# Patient Record
Sex: MALE | Race: WHITE
[De-identification: names, ages, dates, MRNs, and addresses within clinical notes are randomized per-mention and may not be internally consistent; named-entity substitution may affect disease eponyms.]

---

## 2019-08-30 ENCOUNTER — HOSPITAL ENCOUNTER (INPATIENT)
Dept: HOSPITAL 11 - JP.ED | Age: 74
LOS: 5 days | Discharge: HOME | DRG: 603 | End: 2019-09-04
Attending: INTERNAL MEDICINE | Admitting: INTERNAL MEDICINE
Payer: MEDICARE

## 2019-08-30 DIAGNOSIS — Z79.899: ICD-10-CM

## 2019-08-30 DIAGNOSIS — Z98.890: ICD-10-CM

## 2019-08-30 DIAGNOSIS — E11.9: ICD-10-CM

## 2019-08-30 DIAGNOSIS — Z85.038: ICD-10-CM

## 2019-08-30 DIAGNOSIS — Z96.641: ICD-10-CM

## 2019-08-30 DIAGNOSIS — I10: ICD-10-CM

## 2019-08-30 DIAGNOSIS — Z98.49: ICD-10-CM

## 2019-08-30 DIAGNOSIS — Z90.49: ICD-10-CM

## 2019-08-30 DIAGNOSIS — E78.00: ICD-10-CM

## 2019-08-30 DIAGNOSIS — E66.9: ICD-10-CM

## 2019-08-30 DIAGNOSIS — Z87.891: ICD-10-CM

## 2019-08-30 DIAGNOSIS — L03.116: Primary | ICD-10-CM

## 2019-08-30 DIAGNOSIS — Z79.4: ICD-10-CM

## 2019-08-30 DIAGNOSIS — Z79.82: ICD-10-CM

## 2019-08-30 DIAGNOSIS — E11.649: ICD-10-CM

## 2019-08-30 DIAGNOSIS — Z79.51: ICD-10-CM

## 2019-08-30 PROCEDURE — 87205 SMEAR GRAM STAIN: CPT

## 2019-08-30 PROCEDURE — 87186 SC STD MICRODIL/AGAR DIL: CPT

## 2019-08-30 PROCEDURE — 85025 COMPLETE CBC W/AUTO DIFF WBC: CPT

## 2019-08-30 PROCEDURE — 87040 BLOOD CULTURE FOR BACTERIA: CPT

## 2019-08-30 PROCEDURE — 36415 COLL VENOUS BLD VENIPUNCTURE: CPT

## 2019-08-30 PROCEDURE — 87077 CULTURE AEROBIC IDENTIFY: CPT

## 2019-08-30 PROCEDURE — 87070 CULTURE OTHR SPECIMN AEROBIC: CPT

## 2019-08-30 PROCEDURE — 96365 THER/PROPH/DIAG IV INF INIT: CPT

## 2019-08-30 PROCEDURE — 83605 ASSAY OF LACTIC ACID: CPT

## 2019-08-30 PROCEDURE — 99284 EMERGENCY DEPT VISIT MOD MDM: CPT

## 2019-08-30 PROCEDURE — 80053 COMPREHEN METABOLIC PANEL: CPT

## 2019-08-30 RX ADMIN — Medication SCH CAP: at 20:51

## 2019-08-30 RX ADMIN — INSULIN LISPRO SCH UNIT: 100 INJECTION, SOLUTION INTRAVENOUS; SUBCUTANEOUS at 22:14

## 2019-08-30 NOTE — PCM.HP.2
H&P History of Present Illness





- General


Date of Service: 08/30/19


Admit Problem/Dx: 


 Admission Diagnosis/Problem





Admission Diagnosis/Problem      Cellulitis of left anterior lower leg








Source of Information: Patient, Family, Provider


History Limitations: Reports: No Limitations





- History of Present Illness


Initial Comments - Free Text/Narative: 





CC: This leg is bothering me





HPI: Vasiliy presents to the emergency room today with left lower extremity 

swelling, redness and drainage. He thinks he bumped his anterior shin a couple 

of days ago but has not had any significant pain. Yesterday morning he noticed 

that his left leg was a little swollen and slightly red in the morning but did 

not pay attention to it throughout the day. He has not had any pain in the leg. 

He thought it was a little bit more swollen and red this morning. This 

afternoon it was itchy so he had some look at and they told him to get checked 

out right away with concerns for cellulitis. He reports itchy skin throughout 

the lower leg especially anteriorly. He does not have any numbness or tingling. 

He has had chills that started last night but has not measured any fevers at 

home. He feels weak and tired but otherwise feels okay. No complaints of nausea

, shortness of breath, abdominal pain or diarrhea. He has not had any sick 

contacts. He has never had cellulitis before. No recent antibiotics. He does 

not check his blood sugars regularly but did notice that it was low this 

morning at 45.





Workup in the emergency room revealed fairly normal labs. Physical examination 

revealed obvious cellulitis involving almost the entire left leg below the 

knee. He does have an open weeping area on the anterior shin that was cultured. 

He has received vancomycin and ertapenem. Cultures have been obtained. He is 

receiving some IV fluids. He will be admitted for further management.


  ** Left Leg


Pain Score (Numeric/FACES): 6





- Related Data


Allergies/Adverse Reactions: 


 Allergies











Allergy/AdvReac Type Severity Reaction Status Date / Time


 


No Known Allergies Allergy   Verified 08/30/19 18:02











Home Medications: 


 Home Meds





Aspirin [Ecotrin EC] 81 mg PO DAILY 08/30/19 [History]


Cholestyramine/Aspartame [Prevalite Powder] 4 gm PO BID 08/30/19 [History]


Dulaglutide [Trulicity] 1.5 mg SQ WEEKLY 08/30/19 [History]


Fluticasone Propionate [Flonase] 16 gm NS ASDIRECTED 08/30/19 [History]


Insulin Aspart [NovoLOG] 1 dose SQ TID 08/30/19 [History]


Insulin Degludec [Tresiba Flextouch U-100] 55 unit SQ DAILY 08/30/19 [History]


Lisinopril 10 mg PO DAILY 08/30/19 [History]


Propranolol [Inderal LA 24 Hr] 60 mg PO DAILY 08/30/19 [History]


Simvastatin [Zocor] 20 mg PO BEDTIME 08/30/19 [History]


glipiZIDE [Glucotrol] 15 mg PO DAILY 08/30/19 [History]


metFORMIN [Glucophage] 1,000 mg PO BIDMEALS 08/30/19 [History]











Past Medical History


HEENT History: Reports: Cataract


Cardiovascular History: Reports: High Cholesterol, Hypertension


Gastrointestinal History: Reports: Cholelithiasis


Endocrine/Metabolic History: Reports: Diabetes, Type II


Oncologic (Cancer) History: Reports: Colon


Dermatologic History: Reports: Cellulitis





- Past Surgical History


HEENT Surgical History: Reports: Cataract Surgery


GI Surgical History: Reports: Appendectomy, Cholecystectomy, Colon, Hernia 

Repair/Other


Other GI Surgeries/Procedures: 18 inches large colon


Musculoskeletal Surgical History: Reports: Hip Replacement


Other Musculoskeletal Surgeries/Procedures:: right





Social & Family History





- Family History


Cardiac: Denies: CAD





- Tobacco Use


Smoking Status *Q: Former Smoker


Used Tobacco, but Quit: Yes


Month/Year Tobacco Last Used: 40 years





- Caffeine Use


Caffeine Use: Reports: Coffee





- Recreational Drug Use


Recreational Drug Use: No





H&P Review of Systems





- Review of Systems:


Review Of Systems: See Below


Free Text/Narrative: 





A complete 12 point review of systems was obtained.  Pertinent positives and 

negatives are noted in the history of present illness.  All other systems were 

reviewed and were negative except as noted.





Exam





- Exam


Exam: See Below





- Vital Signs


Vital Signs: 


 Last Vital Signs











Temp  38.2 C H  08/30/19 18:59


 


Pulse  100   08/30/19 18:59


 


Resp  18   08/30/19 18:59


 


BP  180/76 H  08/30/19 18:59


 


Pulse Ox  97   08/30/19 18:59











Weight: 113.6 kg





- Exam


Quality Assessment: No: Supplemental Oxygen


General: Alert, Oriented, Cooperative.  No: Mild Distress


HEENT: Conjunctiva Clear, Mucosa Moist & Pink.  No: Scleral Icterus


Neck: Supple, Trachea Midline.  No: Lymphadenopathy


Lungs: Clear to Auscultation, Normal Respiratory Effort


Cardiovascular: Regular Rate, Regular Rhythm


GI/Abdominal Exam: Soft, No Distention


Extremities: Pedal Edema (pitting edema bilaterally to above the knee ), 

Increased Warmth (left lower leg from knee distally )


Skin: Warm, Dry, Rash (erythema over most of the left lower leg from the knee 

distally. Small open area with surrounding weeping anterior mid shin. small 

vesicles surrounding this area )


Neuro Extensive - Mental Status: Alert, Oriented x3, Nl Response to Commands


Neuro Extensive - Motor, Sensory, Reflexes: No: Dysarthria, Abnormal Motor, 

Tremor


Psychiatric: Alert, Normal Affect





- Patient Data


Lab Results Last 24 hrs: 


 Laboratory Results - last 24 hr











  08/30/19 08/30/19 08/30/19 Range/Units





  18:25 18:25 18:26 


 


WBC  6.7    (4.5-11.0)  K/uL


 


RBC  3.22 L    (4.30-5.90)  M/uL


 


Hgb  10.2 L    (12.0-15.0)  g/dL


 


Hct  32.2 L    (40.0-54.0)  %


 


MCV  100 H    (80-98)  fL


 


MCH  32 H    (27-31)  pg


 


MCHC  32    (32-36)  %


 


Plt Count  93 L    (150-400)  K/uL


 


Neut % (Auto)  73 H    (36-66)  %


 


Lymph % (Auto)  8 L    (24-44)  %


 


Mono % (Auto)  19 H    (2-6)  %


 


Eos % (Auto)  1 L    (2-4)  %


 


Baso % (Auto)  0    (0-1)  %


 


Sodium   133 L   (140-148)  mmol/L


 


Potassium   4.6   (3.6-5.2)  mmol/L


 


Chloride   100   (100-108)  mmol/L


 


Carbon Dioxide   23   (21-32)  mmol/L


 


Anion Gap   14.6 H   (5.0-14.0)  mmol/L


 


BUN   16   (7-18)  mg/dL


 


Creatinine   1.0   (0.8-1.3)  mg/dL


 


Est Cr Clr Drug Dosing   72.16   mL/min


 


Estimated GFR (MDRD)   > 60   (>60)  


 


Glucose   63 L   ()  mg/dL


 


Lactic Acid    1.3  (0.4-2.0)  mmol/L


 


Calcium   8.6   (8.5-10.1)  mg/dL


 


Total Bilirubin   1.5 H   (0.2-1.0)  mg/dL


 


AST   79 H   (15-37)  U/L


 


ALT   57   (12-78)  U/L


 


Alkaline Phosphatase   252 H   ()  U/L


 


Total Protein   6.8   (6.4-8.2)  g/dL


 


Albumin   2.6 L   (3.4-5.0)  g/dL


 


Globulin   4.2 H   (2.3-3.5)  g/dL


 


Albumin/Globulin Ratio   0.6 L   (1.2-2.2)  











Result Diagrams: 


 08/30/19 18:25





 08/30/19 18:25


Felix Results Last 24 hrs: 


 Microbiology











 08/30/19 18:30 Gram Stain - Final





 Leg, Left 














*Q Meaningful Use (ADM)





- VTE *Q


VTE Mechanical Contraindications *Q: Bilateral Lower Edema





- VTE Risk Assess *Q


Each Risk Factor Represents 1 Point: Swollen Legs, Current, Obesity ( BMI > 25 

kg/m2)


Total Score 1 Point Risk Factors: 2


Each Risk Factor Represents 2 Points: Age 60 - 74 Years, Malignancy (present or 

previous)


Total Score 2 Point Risk Factors: 4


Each Risk Factor Represents 3 Points: None


Total Score 3 Point Risk Factors: 0


Each Risk Factor Represents 5 Points: None


Total Score 5 Point Risk Factors: 0


Venous Thromboembolism Risk Factor Score *Q: 6





- Problem List


(1) Cellulitis of left lower extremity


SNOMED Code(s): 540918585


   ICD Code: L03.116 - CELLULITIS OF LEFT LOWER LIMB   Status: Acute   Current 

Visit: Yes   





(2) Insulin dependent diabetes mellitus


SNOMED Code(s): 09984292


   ICD Code: E11.9 - TYPE 2 DIABETES MELLITUS WITHOUT COMPLICATIONS; Z79.4 - 

LONG TERM (CURRENT) USE OF INSULIN   Status: Chronic   Current Visit: Yes   





(3) Hypertension


SNOMED Code(s): 47597087


   ICD Code: I10 - ESSENTIAL (PRIMARY) HYPERTENSION   Status: Chronic   Current 

Visit: Yes   


Qualifiers: 


   Hypertension type: essential hypertension   Qualified Code(s): I10 - 

Essential (primary) hypertension   





(4) Colon cancer


SNOMED Code(s): 281956143


   ICD Code: C18.9 - MALIGNANT NEOPLASM OF COLON, UNSPECIFIED   Status: Chronic

   Current Visit: Yes   


Qualifiers: 


   Colon location: unspecified part of colon   Qualified Code(s): C18.9 - 

Malignant neoplasm of colon, unspecified   


Problem List Initiated/Reviewed/Updated: Yes


Orders Last 24hrs: 


 Active Orders 24 hr











 Category Date Time Status


 


 Patient Status Manage Transfer [TRANSFER] Routine ADT  08/30/19 19:32 Active


 


 CULTURE BLOOD [BC] Urgent Lab  08/30/19 18:30 Received


 


 CULTURE BLOOD [BC] Urgent Lab  08/30/19 18:35 Received


 


 CULTURE WOUND + SMEAR [RM] Stat Lab  08/30/19 18:30 Results


 


 Sodium Chloride 0.9% [Normal Saline] 1,000 ml Med  08/30/19 18:30 Active





 IV ASDIRECTED   


 


 Vancomycin 1 gm Med  08/30/19 18:27 Active





 Sodium Chloride 0.9% [Normal Saline] 250 ml   





 IV ONETIME   


 


 Vancomycin 1 gm Med  08/30/19 19:31 Active





 Sodium Chloride 0.9% [Normal Saline] 250 ml   





 IV ONETIME   


 


 Blood Culture x2 Reflex Set [OM.PC] Urgent Oth  08/30/19 18:26 Ordered


 


 Resuscitation Status Routine Resus Stat  08/30/19 19:34 Ordered








 Medication Orders





Sodium Chloride (Normal Saline)  1,000 mls @ 500 mls/hr IV ASDIRECTED [facility]


   Last Admin: 08/30/19 18:55  Dose: 500 mls/hr


Vancomycin HCl 1 gm/ Sodium (Chloride)  250 mls @ 150 mls/hr IV ONETIME ONE


   Stop: 08/30/19 20:06


   Last Admin: 08/30/19 18:56  Dose: 150 mls/hr


Vancomycin HCl 1 gm/ Sodium (Chloride)  250 mls @ 150 mls/hr IV ONETIME ONE


   Stop: 08/30/19 21:10








Assessment/Plan Comment:: 





ASSESSMENT AND PLAN - 





Left lower extremity cellulitis - he does not meet criteria for sepsis. Fairly 

rapid spread over the past 36 hours. Immune compromised because of recent 

steroids, colon cancer treatment and diabetes. No recent antibiotics. Fairly 

impressive infection. He has received broad-spectrum antibiotics and cultures 

have been obtained.


-Continue vancomycin


-Start Pip/Tazo tomorrow night 


-Follow-up cultures


-Pain control


-Once heart rate has stabilized initiate diuresis to help with lower extremity 

edema





Insulin-dependent diabetes mellitus - blood sugar was low this morning and 

remains low tonight. Patient is asymptomatic at 67.


-Hold long-acting insulin


-Mealtime insulin 5 units


-Metformin


-Reassess additional management tomorrow





Colon cancer - last chemotherapy was about one month ago. Recently told by the 

oncologist that he is cancer free at this time.





Essential hypertension - continue home medications





Maintenance issues - 


- DVT prophylaxis - enoxaparin


- GI prophylaxis - not indicated


- Nutrition - diabetic


- Cash catheter - not indicated





CODE STATUS - full code





Admission justification - This patient will be admitted for inpatient services 

and is medically appropriate meeting medical necessity for inpatient admission 

as outlined in my documentation.  I reasonably expect the patient will require 

inpatient services that span a period time over 2 midnights. I reasonably 

expect this patient to be discharged or transferred within 96 hours after 

admission to the [facility].





Disposition - I would anticipate discharge home after the hospital stay





Primary care physician - Carla Goyal M.D.





- Mortality Measure


Prognosis:: Good

## 2019-08-30 NOTE — EDM.PDOC
ED HPI GENERAL MEDICAL PROBLEM





- General


Chief Complaint: Skin Complaint


Stated Complaint: CELULITIS


Time Seen by Provider: 08/30/19 18:15


Source of Information: Reports: Patient, Family


History Limitations: Reports: No Limitations





- History of Present Illness


INITIAL COMMENTS - FREE TEXT/NARRATIVE: 





73-year-old male arrives to the emergency room with a very erythematous, mildly 

painful weeping cellulitis of his left lower leg. He believes this started 48-

72 hours ago when he bumped his leg. He is diabetic. His glucose this morning 

was 45, he has not checked it since. He is running a temperature of 100.6, [facility] 

[facility] and on the way back he was very chilled and needed "2

" coats. [facility] into [facility] and they sent him to the emergency 

room. He is not currently on an antibiotic. He has had problems with bilateral 

lower extremity edema for the past several weeks. He denies any other symptoms 

such as shortness of breath, chest pain, palpitations, abdominal pain, nausea 

or vomiting or diarrhea.


Onset: Gradual


Duration: Day(s): (3 days)


Location: Reports: Lower Extremity, Left


Associated Symptoms: Reports: Fever/Chills


  ** Left Leg


Pain Score (Numeric/FACES): 6





- Related Data


 Allergies











Allergy/AdvReac Type Severity Reaction Status Date / Time


 


No Known Allergies Allergy   Verified 08/30/19 18:02











Home Meds: 


 Home Meds





Aspirin [Ecotrin EC] 81 mg PO DAILY 08/30/19 [History]


Cholestyramine/Aspartame [Prevalite Powder] 4 gm PO BID 08/30/19 [History]


Dulaglutide [Trulicity] 1.5 mg SQ WEEKLY 08/30/19 [History]


Fluticasone Propionate [Flonase] 16 gm NS ASDIRECTED 08/30/19 [History]


Insulin Aspart [NovoLOG] 1 dose SQ TID 08/30/19 [History]


Insulin Degludec [Tresiba Flextouch U-100] 55 unit SQ DAILY 08/30/19 [History]


Lisinopril 10 mg PO DAILY 08/30/19 [History]


Propranolol [Inderal LA 24 Hr] 60 mg PO DAILY 08/30/19 [History]


Simvastatin [Zocor] 20 mg PO BEDTIME 08/30/19 [History]


glipiZIDE [Glucotrol] 15 mg PO DAILY 08/30/19 [History]


metFORMIN [Glucophage] 1,000 mg PO BIDMEALS 08/30/19 [History]











Past Medical History


HEENT History: Reports: Cataract


Cardiovascular History: Reports: High Cholesterol, Hypertension


Gastrointestinal History: Reports: Cholelithiasis


Endocrine/Metabolic History: Reports: Diabetes, Type II


Oncologic (Cancer) History: Reports: Colon


Dermatologic History: Reports: Cellulitis





- Past Surgical History


HEENT Surgical History: Reports: Cataract Surgery


GI Surgical History: Reports: Appendectomy, Cholecystectomy, Colon, Hernia 

Repair/Other


Other GI Surgeries/Procedures: 18 inches large colon


Musculoskeletal Surgical History: Reports: Hip Replacement


Other Musculoskeletal Surgeries/Procedures:: right





Social & Family History





- Tobacco Use


Smoking Status *Q: Former Smoker


Used Tobacco, but Quit: Yes


Month/Year Tobacco Last Used: 40 years





- Caffeine Use


Caffeine Use: Reports: Coffee





- Recreational Drug Use


Recreational Drug Use: No





ED ROS GENERAL





- Review of Systems


Review Of Systems: See Below


Constitutional: Reports: Fever, Chills.  Denies: Decreased Appetite


HEENT: Reports: No Symptoms


Respiratory: Denies: Shortness of Breath


Cardiovascular: Denies: Chest Pain


GI/Abdominal: Denies: Abdominal Pain, Nausea, Vomiting


: Reports: No Symptoms


Neurological: Denies: Paresthesia


Psychiatric: Reports: No Symptoms





ED EXAM, SKIN/RASH


Exam: See Below


Exam Limited By: No Limitations


General Appearance: Alert, No Apparent Distress, Other (Patient is chilled and 

uncomfortable but not distressed)


Eye Exam: Bilateral Eye: EOMI (No jaundice)


Head: Atraumatic


Respiratory/Chest: No Respiratory Distress, Lungs Clear


Cardiovascular: Regular Rate, Rhythm, Tachycardia


GI/Abdominal: Soft, Non-Tender


Extremities: Other (Patient is pitting edema in both lower extremities from the 

knee down. The left leg has extensive significant erythema with distortion of 

the surface of the skin, blistering, and weeping with some oozing of purulent 

material. There is no significant traumatic findings such as a laceration or 

abrasion. The erythema extends down to the foot, up to the knee and slightly on 

the medial aspect of the left thigh.)


Psychiatric: Normal Affect, Normal Mood





Course





- Vital Signs


Last Recorded V/S: 


 Last Vital Signs











Temp  100.8 F H  08/30/19 18:59


 


Pulse  100   08/30/19 18:59


 


Resp  18   08/30/19 18:59


 


BP  180/76 H  08/30/19 18:59


 


Pulse Ox  97   08/30/19 18:59














- Orders/Labs/Meds


Orders: 


 Active Orders 24 hr











 Category Date Time Status


 


 CULTURE BLOOD [BC] Urgent Lab  08/30/19 18:30 Received


 


 CULTURE BLOOD [BC] Urgent Lab  08/30/19 18:35 Received


 


 CULTURE WOUND + SMEAR [RM] Stat Lab  08/30/19 18:30 Results


 


 Blood Culture x2 Reflex Set [OM.PC] Urgent Oth  08/30/19 18:26 Ordered








 Medication Orders





Acetaminophen (Tylenol)  650 mg PO Q4H PRN


   PRN Reason: Pain (Mild 1-3)/fever


Aspirin (Halfprin)  81 mg PO DAILY [facility]


Enoxaparin Sodium (Lovenox)  40 mg SUBCUT DAILY [facility]


Hydroxyzine HCl (Atarax)  25 mg PO Q6H PRN


   PRN Reason: Itching


Piperacillin Sod/Tazobactam (Sod 3.375 gm/ Sodium Chloride)  50 mls @ 100 mls/

hr IV Q6H [facility]


Sodium Chloride (Normal Saline)  1,000 mls @ 125 mls/hr IV ASDIRECTED [facility]


   Last Admin: 08/30/19 22:04  Dose: 125 mls/hr


Vancomycin HCl 1.5 gm/ Sodium (Chloride)  250 mls @ 150 mls/hr IV Q12H [facility]


Insulin Human Lispro (Humalog)  5 unit SUBCUT TIDMEALS [facility]


Insulin Human Lispro (Humalog)  0 unit SUBCUT QIDACANDBED [facility]; Protocol


Lactobacillus Rhamnosus (Culturelle)  1 cap PO BID [facility]


   Last Admin: 08/30/19 20:51  Dose: 1 cap


Lisinopril (Prinivil)  10 mg PO DAILY [facility]


Lorazepam (Ativan)  0.5 mg IVPUSH Q4H PRN


   PRN Reason: Nausea/Vomiting


Magnesium Hydroxide (Milk Of Magnesia)  30 ml PO Q12H PRN


   PRN Reason: Constipation


Melatonin (Melatonin)  9 mg PO BEDTIME PRN


   PRN Reason: Sleep


Metformin HCl (Glucophage)  1,000 mg PO BIDMEALS [facility]


Non-Formulary Medication (Cholestyramine/Aspartame [Prevalite Powder])  4 gm PO 

BID [facility]


Ondansetron HCl (Zofran Odt)  4 mg PO Q6H PRN


   PRN Reason: Nausea able to take PO


Ondansetron HCl (Zofran)  4 mg IV Q6H PRN


   PRN Reason: Nausea/Vomiting


Propranolol HCl (Inderal La)  60 mg PO DAILY [facility]


Senna/Docusate Sodium (Senna Plus)  1 tab PO BID PRN


   PRN Reason: Constipation


Simvastatin (Zocor)  20 mg PO BEDTIME [facility]


   Last Admin: 08/30/19 20:51  Dose: 20 mg








Labs: 


 Laboratory Tests











  08/30/19 08/30/19 08/30/19 Range/Units





  18:25 18:25 18:26 


 


WBC  6.7    (4.5-11.0)  K/uL


 


RBC  3.22 L    (4.30-5.90)  M/uL


 


Hgb  10.2 L    (12.0-15.0)  g/dL


 


Hct  32.2 L    (40.0-54.0)  %


 


MCV  100 H    (80-98)  fL


 


MCH  32 H    (27-31)  pg


 


MCHC  32    (32-36)  %


 


Plt Count  93 L    (150-400)  K/uL


 


Neut % (Auto)  73 H    (36-66)  %


 


Lymph % (Auto)  8 L    (24-44)  %


 


Mono % (Auto)  19 H    (2-6)  %


 


Eos % (Auto)  1 L    (2-4)  %


 


Baso % (Auto)  0    (0-1)  %


 


Sodium   133 L   (140-148)  mmol/L


 


Potassium   4.6   (3.6-5.2)  mmol/L


 


Chloride   100   (100-108)  mmol/L


 


Carbon Dioxide   23   (21-32)  mmol/L


 


Anion Gap   14.6 H   (5.0-14.0)  mmol/L


 


BUN   16   (7-18)  mg/dL


 


Creatinine   1.0   (0.8-1.3)  mg/dL


 


Est Cr Clr Drug Dosing   72.16   mL/min


 


Estimated GFR (MDRD)   > 60   (>60)  


 


Glucose   63 L   ()  mg/dL


 


Lactic Acid    1.3  (0.4-2.0)  mmol/L


 


Calcium   8.6   (8.5-10.1)  mg/dL


 


Total Bilirubin   1.5 H   (0.2-1.0)  mg/dL


 


AST   79 H   (15-37)  U/L


 


ALT   57   (12-78)  U/L


 


Alkaline Phosphatase   252 H   ()  U/L


 


Total Protein   6.8   (6.4-8.2)  g/dL


 


Albumin   2.6 L   (3.4-5.0)  g/dL


 


Globulin   4.2 H   (2.3-3.5)  g/dL


 


Albumin/Globulin Ratio   0.6 L   (1.2-2.2)  











Meds: 


Medications











Generic Name Dose Route Start Last Admin





  Trade Name Freq  PRN Reason Stop Dose Admin


 


Acetaminophen  650 mg  08/30/19 20:23  





  Tylenol  PO   





  Q4H PRN   





  Pain (Mild 1-3)/fever   





     





     





     


 


Aspirin  81 mg  08/31/19 09:00  





  Halfprin  PO   





  DAILY [facility]   





     





     





     





     


 


Enoxaparin Sodium  40 mg  08/31/19 09:00  





  Lovenox  SUBCUT   





  DAILY [facility]   





     





     





     





     


 


Hydroxyzine HCl  25 mg  08/30/19 20:23  





  Atarax  PO   





  Q6H PRN   





  Itching   





     





     





     


 


Piperacillin Sod/Tazobactam  50 mls @ 100 mls/hr  08/31/19 19:00  





  Sod 3.375 gm/ Sodium Chloride  IV   





  Q6H SOM   





     





     





     





     


 


Sodium Chloride  1,000 mls @ 125 mls/hr  08/30/19 20:23  08/30/19 22:04





  Normal Saline  IV   125 mls/hr





  ASDIRECTED [facility]   Administration





     





     





     





     


 


Vancomycin HCl 1.5 gm/ Sodium  250 mls @ 150 mls/hr  08/31/19 08:00  





  Chloride  IV   





  Q12H [facility]   





     





     





     





     


 


Insulin Human Lispro  5 unit  08/31/19 08:00  





  Humalog  SUBCUT   





  TIDMEALS [facility]   





     





     





     





     


 


Insulin Human Lispro  0 unit  08/30/19 20:23  





  Humalog  SUBCUT   





  QIDACANDBED [facility]   





     





     





  Protocol   





     


 


Lactobacillus Rhamnosus  1 cap  08/30/19 21:00  08/30/19 20:51





  Culturelle  PO   1 cap





  BID [facility]   Administration





     





     





     





     


 


Lisinopril  10 mg  08/31/19 09:00  





  Prinivil  PO   





  DAILY [facility]   





     





     





     





     


 


Lorazepam  0.5 mg  08/30/19 20:23  





  Ativan  IVPUSH   





  Q4H PRN   





  Nausea/Vomiting   





     





     





     


 


Magnesium Hydroxide  30 ml  08/30/19 20:23  





  Milk Of Magnesia  PO   





  Q12H PRN   





  Constipation   





     





     





     


 


Melatonin  9 mg  08/30/19 20:23  





  Melatonin  PO   





  BEDTIME PRN   





  Sleep   





     





     





     


 


Metformin HCl  1,000 mg  08/31/19 08:00  





  Glucophage  PO   





  BIDMEALS [facility]   





     





     





     





     


 


Non-Formulary Medication  4 gm  08/30/19 21:00  





  Cholestyramine/Aspartame [Prevalite Powder]  PO   





  BID [facility]   





     





     





     





     


 


Ondansetron HCl  4 mg  08/30/19 20:23  





  Zofran Odt  PO   





  Q6H PRN   





  Nausea able to take PO   





     





     





     


 


Ondansetron HCl  4 mg  08/30/19 20:23  





  Zofran  IV   





  Q6H PRN   





  Nausea/Vomiting   





     





     





     


 


Propranolol HCl  60 mg  08/31/19 09:00  





  Inderal La  PO   





  DAILY [facility]   





     





     





     





     


 


Senna/Docusate Sodium  1 tab  08/30/19 20:23  





  Senna Plus  PO   





  BID PRN   





  Constipation   





     





     





     


 


Simvastatin  20 mg  08/30/19 21:00  08/30/19 20:51





  Zocor  PO   20 mg





  BEDTIME [facility]OM   Administration





     





     





     





     














Discontinued Medications














Generic Name Dose Route Start Last Admin





  Trade Name Freq  PRN Reason Stop Dose Admin


 


Ertapenem 1 gm/ Sodium  100 mls @ 200 mls/hr  08/30/19 18:27  08/30/19 20:50





  Chloride  IV  08/30/19 18:56  200 mls/hr





  ONETIME ONE   Administration





     





     





     





     


 


Sodium Chloride  1,000 mls @ 500 mls/hr  08/30/19 18:30  08/30/19 18:55





  Normal Saline  IV   500 mls/hr





  ASDIRECTED [facility]OM   Administration





     





     





     





     


 


Vancomycin HCl 1 gm/ Sodium  250 mls @ 150 mls/hr  08/30/19 18:27  08/30/19 18:

56





  Chloride  IV  08/30/19 20:06  150 mls/hr





  ONETIME ONE   Administration





     





     





     





     


 


Vancomycin HCl 1 gm/ Sodium  250 mls @ 150 mls/hr  08/30/19 19:31  





  Chloride  IV  08/30/19 21:10  





  ONETIME ONE   





     





     





     





     














- Re-Assessments/Exams


Free Text/Narrative Re-Assessment/Exam: 





08/30/19 18:35


An IV was started, normal saline at 500 mL an hour was started. Patient will be 

treated for significant left lower leg cellulitis with possible early sepsis. 

Blood cultures 2 obtained, CBC CMP and lactic acid. After blood cultures, 1 g 

of Invanz and 1 g of vancomycin will be started. He'll likely need 

hospitalization by the hospitalist service.





Departure





- Departure


Time of Disposition: 20:07


Disposition: Admitted As Inpatient 68


Clinical Impression: 


 Cellulitis and abscess of left leg








- Discharge Information





- My Orders


Last 24 Hours: 


My Active Orders





08/30/19 18:26


Blood Culture x2 Reflex Set [OM.PC] Urgent 





08/30/19 18:30


CULTURE BLOOD [BC] Urgent 


CULTURE WOUND + SMEAR [RM] Stat 





08/30/19 18:35


CULTURE BLOOD [BC] Urgent 














- Assessment/Plan


Last 24 Hours: 


My Active Orders





08/30/19 18:26


Blood Culture x2 Reflex Set [OM.PC] Urgent 





08/30/19 18:30


CULTURE BLOOD [BC] Urgent 


CULTURE WOUND + SMEAR [RM] Stat 





08/30/19 18:35


CULTURE BLOOD [BC] Urgent

## 2019-08-31 RX ADMIN — INSULIN LISPRO SCH UNIT: 100 INJECTION, SOLUTION INTRAVENOUS; SUBCUTANEOUS at 17:30

## 2019-08-31 RX ADMIN — INSULIN LISPRO SCH UNIT: 100 INJECTION, SOLUTION INTRAVENOUS; SUBCUTANEOUS at 11:46

## 2019-08-31 RX ADMIN — INSULIN LISPRO SCH UNITS: 100 INJECTION, SOLUTION INTRAVENOUS; SUBCUTANEOUS at 17:30

## 2019-08-31 RX ADMIN — PROPRANOLOL HYDROCHLORIDE SCH MG: 60 CAPSULE, EXTENDED RELEASE ORAL at 08:36

## 2019-08-31 RX ADMIN — FLUTICASONE PROPIONATE SCH SPRAY: 50 SPRAY, METERED NASAL at 11:45

## 2019-08-31 RX ADMIN — INSULIN LISPRO SCH UNIT: 100 INJECTION, SOLUTION INTRAVENOUS; SUBCUTANEOUS at 21:04

## 2019-08-31 RX ADMIN — Medication SCH CAP: at 20:12

## 2019-08-31 RX ADMIN — INSULIN LISPRO SCH UNITS: 100 INJECTION, SOLUTION INTRAVENOUS; SUBCUTANEOUS at 08:38

## 2019-08-31 RX ADMIN — CHOLESTYRAMINE SCH GM: 4 POWDER, FOR SUSPENSION ORAL at 20:12

## 2019-08-31 RX ADMIN — TAZOBACTAM SODIUM AND PIPERACILLIN SODIUM SCH MLS/HR: 375; 3 INJECTION, SOLUTION INTRAVENOUS at 17:35

## 2019-08-31 RX ADMIN — INSULIN LISPRO SCH UNITS: 100 INJECTION, SOLUTION INTRAVENOUS; SUBCUTANEOUS at 11:46

## 2019-08-31 RX ADMIN — INSULIN LISPRO SCH: 100 INJECTION, SOLUTION INTRAVENOUS; SUBCUTANEOUS at 07:44

## 2019-08-31 RX ADMIN — Medication SCH CAP: at 08:35

## 2019-08-31 NOTE — PCM.PN
- General Info


Date of Service: 08/31/19


Subjective Update: 





No acute events overnight following admission. No pain in his left leg. No 

nausea or vomiting. Leg looks much better today with less swelling and less 

erythema. Right leg swelling also much less today. Cultures are negative so 

far. Blood sugars remain on the low side despite drastic reductions in his 

diabetes medications.


Functional Status: Reports: Pain Controlled, Tolerating Diet





- Review of Systems


General: Denies: Fever


Musculoskeletal: Reports: Leg Pain





- Patient Data


Vitals - Most Recent: 


 Last Vital Signs











Temp  36.9 C   08/31/19 07:00


 


Pulse  84   08/31/19 07:00


 


Resp  16   08/31/19 07:00


 


BP  117/64   08/31/19 08:35


 


Pulse Ox  96   08/31/19 07:00











Weight - Most Recent: 114.577 kg


I&O - Last 24 Hours: 


 Intake & Output











 08/30/19 08/31/19 08/31/19





 22:59 06:59 14:59


 


Intake Total 360 1166 620


 


Output Total  500 1000


 


Balance 360 666 -380











Lab Results Last 24 Hours: 


 Laboratory Results - last 24 hr











  08/30/19 08/30/19 08/30/19 Range/Units





  18:25 18:25 18:26 


 


WBC  6.7    (4.5-11.0)  K/uL


 


RBC  3.22 L    (4.30-5.90)  M/uL


 


Hgb  10.2 L    (12.0-15.0)  g/dL


 


Hct  32.2 L    (40.0-54.0)  %


 


MCV  100 H    (80-98)  fL


 


MCH  32 H    (27-31)  pg


 


MCHC  32    (32-36)  %


 


Plt Count  93 L    (150-400)  K/uL


 


Neut % (Auto)  73 H    (36-66)  %


 


Lymph % (Auto)  8 L    (24-44)  %


 


Mono % (Auto)  19 H    (2-6)  %


 


Eos % (Auto)  1 L    (2-4)  %


 


Baso % (Auto)  0    (0-1)  %


 


Sodium   133 L   (140-148)  mmol/L


 


Potassium   4.6   (3.6-5.2)  mmol/L


 


Chloride   100   (100-108)  mmol/L


 


Carbon Dioxide   23   (21-32)  mmol/L


 


Anion Gap   14.6 H   (5.0-14.0)  mmol/L


 


BUN   16   (7-18)  mg/dL


 


Creatinine   1.0   (0.8-1.3)  mg/dL


 


Est Cr Clr Drug Dosing   72.16   mL/min


 


Estimated GFR (MDRD)   > 60   (>60)  


 


Glucose   63 L   ()  mg/dL


 


Lactic Acid    1.3  (0.4-2.0)  mmol/L


 


Calcium   8.6   (8.5-10.1)  mg/dL


 


Total Bilirubin   1.5 H   (0.2-1.0)  mg/dL


 


AST   79 H   (15-37)  U/L


 


ALT   57   (12-78)  U/L


 


Alkaline Phosphatase   252 H   ()  U/L


 


Total Protein   6.8   (6.4-8.2)  g/dL


 


Albumin   2.6 L   (3.4-5.0)  g/dL


 


Globulin   4.2 H   (2.3-3.5)  g/dL


 


Albumin/Globulin Ratio   0.6 L   (1.2-2.2)  














  08/31/19 08/31/19 Range/Units





  04:15 04:15 


 


WBC  4.4 L   (4.5-11.0)  K/uL


 


RBC  3.09 L   (4.30-5.90)  M/uL


 


Hgb  9.8 L   (12.0-15.0)  g/dL


 


Hct  31.2 L   (40.0-54.0)  %


 


MCV  101 H   (80-98)  fL


 


MCH  32 H   (27-31)  pg


 


MCHC  31 L   (32-36)  %


 


Plt Count  91 L   (150-400)  K/uL


 


Neut % (Auto)    (36-66)  %


 


Lymph % (Auto)    (24-44)  %


 


Mono % (Auto)    (2-6)  %


 


Eos % (Auto)    (2-4)  %


 


Baso % (Auto)    (0-1)  %


 


Sodium   137 L  (140-148)  mmol/L


 


Potassium   4.3  (3.6-5.2)  mmol/L


 


Chloride   104  (100-108)  mmol/L


 


Carbon Dioxide   26  (21-32)  mmol/L


 


Anion Gap   11.3  (5.0-14.0)  mmol/L


 


BUN   15  (7-18)  mg/dL


 


Creatinine   1.0  (0.8-1.3)  mg/dL


 


Est Cr Clr Drug Dosing   72.16  mL/min


 


Estimated GFR (MDRD)   > 60  (>60)  


 


Glucose   78  ()  mg/dL


 


Lactic Acid    (0.4-2.0)  mmol/L


 


Calcium   8.2 L  (8.5-10.1)  mg/dL


 


Total Bilirubin    (0.2-1.0)  mg/dL


 


AST    (15-37)  U/L


 


ALT    (12-78)  U/L


 


Alkaline Phosphatase    ()  U/L


 


Total Protein    (6.4-8.2)  g/dL


 


Albumin    (3.4-5.0)  g/dL


 


Globulin    (2.3-3.5)  g/dL


 


Albumin/Globulin Ratio    (1.2-2.2)  











Felix Results Last 24 Hours: 


 Microbiology











 08/30/19 18:30 Gram Stain - Final





 Leg, Left 











Med Orders - Current: 


 Current Medications





Acetaminophen (Tylenol)  650 mg PO Q4H PRN


   PRN Reason: Pain (Mild 1-3)/fever


   Last Admin: 08/30/19 22:22 Dose:  650 mg


Aspirin (Halfprin)  81 mg PO DAILY [facility]


   Last Admin: 08/31/19 08:36 Dose:  81 mg


Fluticasone Propionate (Flonase)  0 gm NASBOTH DAILY [facility]


Hydroxyzine HCl (Atarax)  25 mg PO Q6H PRN


   PRN Reason: Itching


Vancomycin HCl 1.5 gm/ Sodium (Chloride)  250 mls @ 150 mls/hr IV Q12H [facility]


   Last Admin: 08/31/19 08:34 Dose:  150 mls/hr


Piperacillin/Tazobactam/ (Dextrose 3.375 gm/ Premix)  50 mls @ 100 mls/hr IV 

Q6H [facility]


Sodium Chloride (Normal Saline)  1,000 mls @ 25 mls/hr IV ASDIRECTED [facility]


Insulin Human Lispro (Humalog)  5 unit SUBCUT TIDMEALS [facility]


   Last Admin: 08/31/19 08:38 Dose:  5 units


Insulin Human Lispro (Humalog)  0 unit SUBCUT QIDACANDBED [facility]; Protocol


   Last Admin: 08/31/19 07:44 Dose:  Not Given


Lactobacillus Rhamnosus (Culturelle)  1 cap PO BID [facility]


   Last Admin: 08/31/19 08:35 Dose:  1 cap


Lisinopril (Prinivil)  10 mg PO DAILY [facility]


   Last Admin: 08/31/19 08:35 Dose:  10 mg


Lorazepam (Ativan)  0.5 mg IVPUSH Q4H PRN


   PRN Reason: Nausea/Vomiting


Magnesium Hydroxide (Milk Of Magnesia)  30 ml PO Q12H PRN


   PRN Reason: Constipation


Melatonin (Melatonin)  9 mg PO BEDTIME PRN


   PRN Reason: Sleep


[facility]


   Last Admin: 08/31/19 08:35 Dose:  1,000 mg


Non-Formulary Medication (Cholestyramine/Aspartame [Prevalite Powder])  4 gm PO 

BID [facility]


Ondansetron HCl (Zofran Odt)  4 mg PO Q6H PRN


   PRN Reason: Nausea able to take PO


Ondansetron HCl (Zofran)  4 mg IV Q6H PRN


   PRN Reason: Nausea/Vomiting


Propranolol HCl (Inderal La)  60 mg PO DAILY [facility]


   Last Admin: 08/31/19 08:36 Dose:  60 mg


Senna/Docusate Sodium (Senna Plus)  1 tab PO BID PRN


   PRN Reason: Constipation


Simvastatin (Zocor)  20 mg PO BEDTIME [facility]


   Last Admin: 08/30/19 20:51 Dose:  20 mg





Discontinued Medications





Enoxaparin Sodium (Lovenox)  40 mg SUBCUT DAILY [facility]


Ertapenem 1 gm/ Sodium (Chloride)  100 mls @ 200 mls/hr IV ONETIME ONE


   Stop: 08/30/19 18:56


   Last Admin: 08/30/19 20:50 Dose:  200 mls/hr


Sodium Chloride (Normal Saline)  1,000 mls @ 500 mls/hr IV ASDIRECTED [facility]


   Last Admin: 08/30/19 18:55 Dose:  500 mls/hr


Vancomycin HCl 1 gm/ Sodium (Chloride)  250 mls @ 150 mls/hr IV ONETIME ONE


   Stop: 08/30/19 20:06


   Last Admin: 08/30/19 18:56 Dose:  150 mls/hr


Vancomycin HCl 1 gm/ Sodium (Chloride)  250 mls @ 150 mls/hr IV ONETIME ONE


   Stop: 08/30/19 21:10


   Last Admin: 08/30/19 22:15 Dose:  150 mls/hr


Piperacillin Sod/Tazobactam (Sod 3.375 gm/ Sodium Chloride)  50 mls @ 100 mls/

hr IV Q6H [facility]


Sodium Chloride (Normal Saline)  1,000 mls @ 125 mls/hr IV ASDIRECTED [facility]


   Last Admin: 08/31/19 06:37 Dose:  125 mls/hr











- Exam


Quality Assessment: No: Supplemental Oxygen


General: Alert, Oriented, Cooperative, No Acute Distress


Lungs: Normal Respiratory Effort


Extremities: Pedal Edema, Increased Warmth (left lower leg)


Skin: Warm, Dry, Rash (erythema and dried scab left lower leg )


Psy/Mental Status: Alert, Normal Affect





- Problem List & Annotations


(1) Cellulitis of left lower extremity


SNOMED Code(s): 854903759


   Code(s): L03.116 - CELLULITIS OF LEFT LOWER LIMB   Status: Acute   Current 

Visit: Yes   





(2) Insulin dependent diabetes mellitus


SNOMED Code(s): 03516903


   Code(s): E11.9 - TYPE 2 DIABETES MELLITUS WITHOUT COMPLICATIONS; Z79.4 - 

LONG TERM (CURRENT) USE OF INSULIN   Status: Chronic   Current Visit: Yes   





(3) Hypertension


SNOMED Code(s): 32053401


   Code(s): I10 - ESSENTIAL (PRIMARY) HYPERTENSION   Status: Chronic   Current 

Visit: Yes   


Qualifiers: 


   Hypertension type: essential hypertension   Qualified Code(s): I10 - 

Essential (primary) hypertension   





(4) Colon cancer


SNOMED Code(s): 736835058


   Code(s): C18.9 - MALIGNANT NEOPLASM OF COLON, UNSPECIFIED   Status: Chronic 

  Current Visit: Yes   


Qualifiers: 


   Colon location: unspecified part of colon   Qualified Code(s): C18.9 - 

Malignant neoplasm of colon, unspecified   





- Problem List Review


Problem List Initiated/Reviewed/Updated: Yes





- My Orders


Last 24 Hours: 


My Active Orders





08/30/19 19:34


Resuscitation Status Routine 





08/30/19 20:23


Patient Status [ADT] Routine 


Communication Order [RC] PRN 


Communication Order [RC] PRN 


Diabetes Education [RC] Click to Edit 


Intake and Output [RC] QSHIFT 


Notify Provider Vital Signs [RC] ASDIRECTED 


Notify Provider [RC] PRN 


Oxygen Therapy [RC] PRN 


Up With Assistance [RC] ASDIRECTED 


Vital Signs [RC] Q4H 


Acetaminophen [Tylenol]   650 mg PO Q4H PRN 


Docusate Sodium/Sennosides [Senna Plus]   1 tab PO BID PRN 


Insulin Lispro [HumaLOG]   See Protocol  SUBCUT QIDACANDBED 


LORazepam [Ativan]   0.5 mg IVPUSH Q4H PRN 


Magnesium Hydroxide [Milk of Magnesia]   30 ml PO Q12H PRN 


Melatonin   9 mg PO BEDTIME PRN 


Ondansetron [Zofran ODT]   4 mg PO Q6H PRN 


Ondansetron [Zofran]   4 mg IV Q6H PRN 


hydrOXYzine HCl [Atarax]   25 mg PO Q6H PRN 





08/30/19 21:00


Cholestyramine/Aspartame [Prevalite Powder]   4 gm PO BID 


Lactobacillus Rhamnosus GG [Culturelle]   1 cap PO BID 


Simvastatin [Zocor]   20 mg PO BEDTIME 





08/31/19 08:00


Insulin Lispro [HumaLOG]   5 unit SUBCUT TIDMEALS 


Vancomycin 1.5 gm   Sodium Chloride 0.9% [Normal Saline] 250 ml IV Q12H 


metFORMIN [Glucophage]   1,000 mg PO BIDMEALS 





08/31/19 09:00


Aspirin [Halfprin]   81 mg PO DAILY 


Lisinopril [Prinivil]   10 mg PO DAILY 


Propranolol [Inderal LA]   60 mg PO DAILY 





08/31/19 10:00


Fluticasone Propionate [Flonase]   0 gm NASBOTH DAILY 





08/31/19 10:35


Sodium Chloride 0.9% [Normal Saline] 1,000 ml IV ASDIRECTED 





08/31/19 11:30


GLUCOSE POC LAB TO COLLECT [POC] QIDACANDBED 





08/31/19 16:30


GLUCOSE POC LAB TO COLLECT [POC] QIDACANDBED 





08/31/19 18:00


Piperacillin/Tazobactam/Dext [Zosyn in Dextrose Iso-Osmotic 3.375 GM] 3.375 gm 

  Premix Bag 1 bag IV Q6H 





08/31/19 21:00


GLUCOSE POC LAB TO COLLECT [POC] QIDACANDBED 





09/01/19 05:00


BASIC METABOLIC PANEL,BMP [CHEM] Timed 


CBC W/O DIFF,HEMOGRAM [HEME] Timed (1) 





09/01/19 07:30


GLUCOSE POC LAB TO COLLECT [POC] QIDACANDBED 





09/01/19 11:30


GLUCOSE POC LAB TO COLLECT [POC] QIDACANDBED 





09/01/19 16:30


GLUCOSE POC LAB TO COLLECT [POC] QIDACANDBED 





09/01/19 21:00


GLUCOSE POC LAB TO COLLECT [POC] QIDACANDBED 





09/02/19 07:30


GLUCOSE POC LAB TO COLLECT [POC] QIDACANDBED 





09/02/19 11:30


GLUCOSE POC LAB TO COLLECT [POC] QIDACANDBED 





09/02/19 16:30


GLUCOSE POC LAB TO COLLECT [POC] QIDACANDBED 





09/02/19 21:00


GLUCOSE POC LAB TO COLLECT [POC] QIDACANDBED 





09/03/19 07:30


GLUCOSE POC LAB TO COLLECT [POC] QIDACANDBED 





09/03/19 11:30


GLUCOSE POC LAB TO COLLECT [POC] QIDACANDBED 





09/03/19 16:30


GLUCOSE POC LAB TO COLLECT [POC] QIDACANDBED 





09/03/19 21:00


GLUCOSE POC LAB TO COLLECT [POC] QIDACANDBED 





09/04/19 07:30


GLUCOSE POC LAB TO COLLECT [POC] QIDACANDBED 





09/04/19 11:30


GLUCOSE POC LAB TO COLLECT [POC] QIDACANDBED 





09/04/19 16:30


GLUCOSE POC LAB TO COLLECT [POC] QIDACANDBED 





09/04/19 21:00


GLUCOSE POC LAB TO COLLECT [POC] QIDACANDBED 





09/05/19 07:30


GLUCOSE POC LAB TO COLLECT [POC] QIDACANDBED 














- Plan


Plan:: 





ASSESSMENT AND PLAN - 





Left lower extremity cellulitis - excellent improvement overnight with 

antibiotics and some IV fluids. Swelling and redness have decreased 

significantly. No fevers overnight. Clinically looks better today. Cultures 

negative so far.


-Continue vancomycin


-Start Pip/Tazo tonight


-Follow-up cultures


-Pain control


-Consider diuresis tomorrow





Insulin-dependent diabetes mellitus - blood sugar still low this morning 

despite drastic reductions.


-Continue to hold long-acting insulin


-Mealtime insulin 5 units


-Metformin


-Reassess additional management tomorrow





Colon cancer - last chemotherapy was about one month ago. Recently told by the 

oncologist that he is cancer free at this time.





Essential hypertension - continue home medications





Maintenance issues - 


- DVT prophylaxis - enoxaparin


- GI prophylaxis - not indicated


- Nutrition - diabetic





Disposition - I would anticipate discharge home after the hospital stay





Primary care physician - [place], [place]





Hilario Farias M.D.

## 2019-09-01 RX ADMIN — FLUTICASONE PROPIONATE SCH SPRAY: 50 SPRAY, METERED NASAL at 08:56

## 2019-09-01 RX ADMIN — CHOLESTYRAMINE SCH GM: 4 POWDER, FOR SUSPENSION ORAL at 20:24

## 2019-09-01 RX ADMIN — INSULIN LISPRO SCH UNITS: 100 INJECTION, SOLUTION INTRAVENOUS; SUBCUTANEOUS at 07:49

## 2019-09-01 RX ADMIN — INSULIN LISPRO SCH UNITS: 100 INJECTION, SOLUTION INTRAVENOUS; SUBCUTANEOUS at 18:08

## 2019-09-01 RX ADMIN — INSULIN LISPRO SCH: 100 INJECTION, SOLUTION INTRAVENOUS; SUBCUTANEOUS at 07:47

## 2019-09-01 RX ADMIN — CHOLESTYRAMINE SCH GM: 4 POWDER, FOR SUSPENSION ORAL at 08:57

## 2019-09-01 RX ADMIN — TAZOBACTAM SODIUM AND PIPERACILLIN SODIUM SCH: 375; 3 INJECTION, SOLUTION INTRAVENOUS at 07:27

## 2019-09-01 RX ADMIN — TAZOBACTAM SODIUM AND PIPERACILLIN SODIUM SCH MLS/HR: 375; 3 INJECTION, SOLUTION INTRAVENOUS at 00:10

## 2019-09-01 RX ADMIN — INSULIN LISPRO SCH: 100 INJECTION, SOLUTION INTRAVENOUS; SUBCUTANEOUS at 12:05

## 2019-09-01 RX ADMIN — Medication SCH CAP: at 20:24

## 2019-09-01 RX ADMIN — PROPRANOLOL HYDROCHLORIDE SCH MG: 60 CAPSULE, EXTENDED RELEASE ORAL at 08:57

## 2019-09-01 RX ADMIN — Medication SCH CAP: at 08:56

## 2019-09-01 RX ADMIN — INSULIN LISPRO SCH UNITS: 100 INJECTION, SOLUTION INTRAVENOUS; SUBCUTANEOUS at 12:05

## 2019-09-01 RX ADMIN — INSULIN LISPRO SCH UNIT: 100 INJECTION, SOLUTION INTRAVENOUS; SUBCUTANEOUS at 20:46

## 2019-09-01 RX ADMIN — INSULIN LISPRO SCH UNIT: 100 INJECTION, SOLUTION INTRAVENOUS; SUBCUTANEOUS at 18:08

## 2019-09-01 NOTE — PCM.PN
- General Info


Date of Service: 09/01/19


Subjective Update: 





There were no acute events overnight. This morning the patient was noted to 

have a few areas of macular rash on his inner thighs and lower abdomen. This 

developed around the time he was due for his next antibiotic and initially 

there was some concern this may be a drug reaction so the antibiotic was held. 

His rash has improved fairly quickly. I do not believe this represented a 

antibiotic reaction. His leg is looking better but still has a fair amount of 

erythema and mild warmth. His wound culture is growing a staph species but 

identification is not complete as of yet.


Functional Status: Reports: Pain Controlled, Tolerating Diet





- Review of Systems


General: Denies: Fever


Musculoskeletal: Reports: Leg Pain


Skin: Reports: Rash, Other (mild patchy macules on inner thighs )





- Patient Data


Vitals - Most Recent: 


 Last Vital Signs











Temp  36.2 C   09/01/19 07:40


 


Pulse  85   09/01/19 07:40


 


Resp  16   09/01/19 07:40


 


BP  128/47 L  09/01/19 08:56


 


Pulse Ox  96   09/01/19 07:40











Weight - Most Recent: 114.577 kg


I&O - Last 24 Hours: 


 Intake & Output











 08/31/19 09/01/19 09/01/19





 22:59 06:59 14:59


 


Intake Total 359 274 990


 


Output Total 950 1200 


 


Balance -591 926 990











Lab Results Last 24 Hours: 


 Laboratory Results - last 24 hr











  09/01/19 09/01/19 Range/Units





  04:40 04:40 


 


WBC  4.6   (4.5-11.0)  K/uL


 


RBC  3.35 L   (4.30-5.90)  M/uL


 


Hgb  10.6 L   (12.0-15.0)  g/dL


 


Hct  33.4 L   (40.0-54.0)  %


 


MCV  100 H   (80-98)  fL


 


MCH  32 H   (27-31)  pg


 


MCHC  32   (32-36)  %


 


Plt Count  96 L   (150-400)  K/uL


 


Sodium   135 L  (140-148)  mmol/L


 


Potassium   4.6  (3.6-5.2)  mmol/L


 


Chloride   103  (100-108)  mmol/L


 


Carbon Dioxide   25  (21-32)  mmol/L


 


Anion Gap   11.6  (5.0-14.0)  mmol/L


 


BUN   15  (7-18)  mg/dL


 


Creatinine   0.9  (0.8-1.3)  mg/dL


 


Est Cr Clr Drug Dosing   80.18  mL/min


 


Estimated GFR (MDRD)   > 60  (>60)  


 


Glucose   107 H  ()  mg/dL


 


Calcium   8.7  (8.5-10.1)  mg/dL











Felix Results Last 24 Hours: 


 Microbiology











 08/30/19 18:30 Gram Stain - Final





 Leg, Left Wound Culture - Preliminary


 


 08/30/19 18:30 Aerobic Blood Culture - Preliminary





 Blood - Venous - Iv Start    NO GROWTH AFTER 1 DAY





 Anaerobic Blood Culture - Preliminary





    NO GROWTH AFTER 1 DAY


 


 08/30/19 18:35 Aerobic Blood Culture - Preliminary





 Blood - Venous - Iv Start    NO GROWTH AFTER 1 DAY





 Anaerobic Blood Culture - Preliminary





    NO GROWTH AFTER 1 DAY











Med Orders - Current: 


 Current Medications





Acetaminophen (Tylenol)  650 mg PO Q4H PRN


   PRN Reason: Pain (Mild 1-3)/fever


   Last Admin: 08/31/19 20:22 Dose:  650 mg


Aspirin (Halfprin)  81 mg PO DAILY [facility]


   Last Admin: 09/01/19 08:56 Dose:  81 mg


Cholestyramine Resin (Cholestyramine Packet)  4 gm PO BID [facility]


   Last Admin: 09/01/19 08:57 Dose:  4 gm


Fluticasone Propionate (Flonase)  0 gm NASBOTH DAILY [facility]


   Last Admin: 09/01/19 08:56 Dose:  1 spray


Hydroxyzine HCl (Atarax)  25 mg PO Q6H PRN


   PRN Reason: Itching


   Last Admin: 09/01/19 05:07 Dose:  25 mg


Vancomycin HCl 1.5 gm/ Sodium (Chloride)  250 mls @ 150 mls/hr IV Q12H [facility]


   Last Admin: 09/01/19 08:57 Dose:  150 mls/hr


Insulin Human Lispro (Humalog)  5 unit SUBCUT TIDMEALS [facility]


   Last Admin: 09/01/19 07:49 Dose:  5 units


Insulin Human Lispro (Humalog)  0 unit SUBCUT QIDACANDBED [facility]; Protocol


   Last Admin: 09/01/19 07:47 Dose:  Not Given


Lactobacillus Rhamnosus (Culturelle)  1 cap PO BID [facility]


   Last Admin: 09/01/19 08:56 Dose:  1 cap


Lisinopril (Prinivil)  10 mg PO DAILY [facility]


   Last Admin: 09/01/19 08:56 Dose:  10 mg


Lorazepam (Ativan)  0.5 mg IVPUSH Q4H PRN


   PRN Reason: Nausea/Vomiting


Magnesium Hydroxide (Milk Of Magnesia)  30 ml PO Q12H PRN


   PRN Reason: Constipation


Melatonin (Melatonin)  9 mg PO BEDTIME PRN


   PRN Reason: Sleep


Metformin HCl (Glucophage)  1,000 mg PO BIDMEALS [facility]


   Last Admin: 09/01/19 07:48 Dose:  1,000 mg


Ondansetron HCl (Zofran Odt)  4 mg PO Q6H PRN


   PRN Reason: Nausea able to take PO


Ondansetron HCl (Zofran)  4 mg IV Q6H PRN


   PRN Reason: Nausea/Vomiting


Propranolol HCl (Inderal La)  60 mg PO DAILY [facility]


   Last Admin: 09/01/19 08:57 Dose:  60 mg


Senna/Docusate Sodium (Senna Plus)  1 tab PO BID PRN


   PRN Reason: Constipation


Simvastatin (Zocor)  20 mg PO BEDTIME [facility]


   Last Admin: 08/31/19 20:12 Dose:  20 mg





Discontinued Medications





Enoxaparin Sodium (Lovenox)  40 mg SUBCUT DAILY [facility]


   Last Admin: 08/31/19 10:56 Dose:  Not Given


Ertapenem 1 gm/ Sodium (Chloride)  100 mls @ 200 mls/hr IV ONETIME ONE


   Stop: 08/30/19 18:56


   Last Admin: 08/30/19 20:50 Dose:  200 mls/hr


Sodium Chloride (Normal Saline)  1,000 mls @ 500 mls/hr IV ASDIRECTED [facility]


   Last Admin: 08/30/19 18:55 Dose:  500 mls/hr


Vancomycin HCl 1 gm/ Sodium (Chloride)  250 mls @ 150 mls/hr IV ONETIME ONE


   Stop: 08/30/19 20:06


   Last Admin: 08/30/19 18:56 Dose:  150 mls/hr


Vancomycin HCl 1 gm/ Sodium (Chloride)  250 mls @ 150 mls/hr IV ONETIME ONE


   Stop: 08/30/19 21:10


   Last Admin: 08/30/19 22:15 Dose:  150 mls/hr


Piperacillin Sod/Tazobactam (Sod 3.375 gm/ Sodium Chloride)  50 mls @ 100 mls/

hr IV Q6H [facility]


Sodium Chloride (Normal Saline)  1,000 mls @ 125 mls/hr IV ASDIRECTED [facility]


   Last Admin: 08/31/19 06:37 Dose:  125 mls/hr


Piperacillin/Tazobactam/ (Dextrose 3.375 gm/ Premix)  50 mls @ 100 mls/hr IV 

Q6H [facility]


   Last Admin: 09/01/19 07:27 Dose:  Not Given


Sodium Chloride (Normal Saline)  1,000 mls @ 25 mls/hr IV ASDIRECTED [facility]


   Last Admin: 09/01/19 07:53 Dose:  25 mls/hr











- Exam


Quality Assessment: No: Supplemental Oxygen


General: Alert, Oriented, Cooperative, No Acute Distress


Lungs: Normal Respiratory Effort


Cardiovascular: Regular Rate, Regular Rhythm


GI/Abdominal Exam: Soft, No Distention


Extremities: Pedal Edema (Mild edema of both feet), Increased Warmth (Left 

anterior shin)


Skin: Warm, Dry, Rash (Deep red erythema over the anterior left shin with mild 

drainage from the middle of the erythema. There is no fluctuance. The area of 

erythema as well within the marked borders and slowly improving)


Psy/Mental Status: Alert, Normal Affect





- Problem List & Annotations


(1) Cellulitis of left lower extremity


SNOMED Code(s): 588784969


   Code(s): L03.116 - CELLULITIS OF LEFT LOWER LIMB   Status: Acute   Current 

Visit: Yes   





(2) Insulin dependent diabetes mellitus


SNOMED Code(s): 77263921


   Code(s): E11.9 - TYPE 2 DIABETES MELLITUS WITHOUT COMPLICATIONS; Z79.4 - 

LONG TERM (CURRENT) USE OF INSULIN   Status: Chronic   Current Visit: Yes   





(3) Hypertension


SNOMED Code(s): 56502523


   Code(s): I10 - ESSENTIAL (PRIMARY) HYPERTENSION   Status: Chronic   Current 

Visit: Yes   


Qualifiers: 


   Hypertension type: essential hypertension   Qualified Code(s): I10 - 

Essential (primary) hypertension   





(4) Colon cancer


SNOMED Code(s): 980053720


   Code(s): C18.9 - MALIGNANT NEOPLASM OF COLON, UNSPECIFIED   Status: Chronic 

  Current Visit: Yes   


Qualifiers: 


   Colon location: unspecified part of colon   Qualified Code(s): C18.9 - 

Malignant neoplasm of colon, unspecified   





- Problem List Review


Problem List Initiated/Reviewed/Updated: Yes





- My Orders


Last 24 Hours: 


My Active Orders





08/31/19 21:00


Cholestyramine/Sucrose [Cholestyramine Packet]   4 gm PO BID 





09/01/19 11:28


Convert IV to Saline Lock [OM.PC] Routine 





09/01/19 11:30


cefTAZidime Pentahydrate [Fortaz] 1 gm   Sodium Chloride 0.9% [Normal Saline] 

50 ml IV Q8H 





09/01/19 16:30


GLUCOSE POC LAB TO COLLECT [POC] QIDACANDBED 





09/01/19 21:00


GLUCOSE POC LAB TO COLLECT [POC] QIDACANDBED 





09/02/19 05:00


BASIC METABOLIC PANEL,BMP [CHEM] Timed 


CBC W/O DIFF,HEMOGRAM [HEME] Timed (1) 





09/02/19 07:30


GLUCOSE POC LAB TO COLLECT [POC] QIDACANDBED 





09/02/19 11:30


GLUCOSE POC LAB TO COLLECT [POC] QIDACANDBED 





09/02/19 16:30


GLUCOSE POC LAB TO COLLECT [POC] QIDACANDBED 





09/02/19 21:00


GLUCOSE POC LAB TO COLLECT [POC] QIDACANDBED 





09/03/19 07:30


GLUCOSE POC LAB TO COLLECT [POC] QIDACANDBED 





09/03/19 11:30


GLUCOSE POC LAB TO COLLECT [POC] QIDACANDBED 





09/03/19 16:30


GLUCOSE POC LAB TO COLLECT [POC] QIDACANDBED 





09/03/19 21:00


GLUCOSE POC LAB TO COLLECT [POC] QIDACANDBED 





09/04/19 07:30


GLUCOSE POC LAB TO COLLECT [POC] QIDACANDBED 





09/04/19 11:30


GLUCOSE POC LAB TO COLLECT [POC] QIDACANDBED 





09/04/19 16:30


GLUCOSE POC LAB TO COLLECT [POC] QIDACANDBED 





09/04/19 21:00


GLUCOSE POC LAB TO COLLECT [POC] QIDACANDBED 





09/05/19 07:30


GLUCOSE POC LAB TO COLLECT [POC] QIDACANDBED 














- Plan


Plan:: 





ASSESSMENT AND PLAN - 





Left lower extremity cellulitis - ongoing improvement but still a fair amount 

of erythema. No fevers. Cultures growing a staph species but final 

identification and sensitivities are pending. We did switch anabiotics though I 

do not believe he had an actual reaction to the Zosyn.


-Continue vancomycin


-Change antibiotics to ceftazidime


-Follow-up cultures


-Pain control


-Trial of diuresis today





Insulin-dependent diabetes mellitus - blood sugar are much better with the 

drastic reductions in his home regimen


-Continue to hold long-acting insulin and glipizide


-Mealtime insulin 5 units


-Metformin


-Reassess additional management again tomorrow





Colon cancer - last chemotherapy was about one month ago. Recently told by the 

oncologist that he is cancer free at this time.





Essential hypertension - continue home medications





Maintenance issues - 


- DVT prophylaxis - enoxaparin


- GI prophylaxis - not indicated


- Nutrition - diabetic





Disposition - I would anticipate discharge home after the hospital stay





Primary care physician - [place], [place]





Hilario Farias M.D.

## 2019-09-02 RX ADMIN — FLUTICASONE PROPIONATE SCH SPRAY: 50 SPRAY, METERED NASAL at 08:10

## 2019-09-02 RX ADMIN — INSULIN LISPRO SCH UNIT: 100 INJECTION, SOLUTION INTRAVENOUS; SUBCUTANEOUS at 17:27

## 2019-09-02 RX ADMIN — ALBUTEROL SULFATE PRN MG: 2.5 SOLUTION RESPIRATORY (INHALATION) at 16:05

## 2019-09-02 RX ADMIN — Medication SCH CAP: at 20:42

## 2019-09-02 RX ADMIN — INSULIN LISPRO SCH UNITS: 100 INJECTION, SOLUTION INTRAVENOUS; SUBCUTANEOUS at 08:13

## 2019-09-02 RX ADMIN — INSULIN LISPRO SCH UNITS: 100 INJECTION, SOLUTION INTRAVENOUS; SUBCUTANEOUS at 17:27

## 2019-09-02 RX ADMIN — CHOLESTYRAMINE SCH GM: 4 POWDER, FOR SUSPENSION ORAL at 20:42

## 2019-09-02 RX ADMIN — INSULIN LISPRO SCH: 100 INJECTION, SOLUTION INTRAVENOUS; SUBCUTANEOUS at 07:58

## 2019-09-02 RX ADMIN — Medication SCH CAP: at 08:11

## 2019-09-02 RX ADMIN — INSULIN LISPRO SCH UNIT: 100 INJECTION, SOLUTION INTRAVENOUS; SUBCUTANEOUS at 21:10

## 2019-09-02 RX ADMIN — INSULIN LISPRO SCH UNITS: 100 INJECTION, SOLUTION INTRAVENOUS; SUBCUTANEOUS at 12:14

## 2019-09-02 RX ADMIN — PROPRANOLOL HYDROCHLORIDE SCH MG: 60 CAPSULE, EXTENDED RELEASE ORAL at 08:11

## 2019-09-02 RX ADMIN — INSULIN LISPRO SCH UNIT: 100 INJECTION, SOLUTION INTRAVENOUS; SUBCUTANEOUS at 12:14

## 2019-09-02 RX ADMIN — ALBUTEROL SULFATE PRN MG: 2.5 SOLUTION RESPIRATORY (INHALATION) at 20:56

## 2019-09-02 RX ADMIN — CHOLESTYRAMINE SCH GM: 4 POWDER, FOR SUSPENSION ORAL at 08:11

## 2019-09-02 NOTE — CRLUS
INDICATION:



Left redness and swelling. Evaluate for abscess with associated cellulitis 



TECHNIQUE:



Sonogram of the left mid and lower calf in the region of clinical concern.



FINDINGS:



Sonogram of the left mid and lower calf in the region of clinical concern 

demonstrates moderate edema in the subcutaneous and soft tissues consistent 

with the known cellulitis. No discrete drainable fluid collection to 

suggest abscess. Remainder negative.



IMPRESSION:



Sonogram of the left mid and lower calf in the region of clinical concern 

demonstrates moderate subcutaneous soft tissue edema consistent with the 

known cellulitis without a definable abscess.



Dictated by Phillip Trujillo MD @ Sep  2 2019  4:08PM



Signed by Dr. Phillip Trujillo @ Sep  2 2019  4:10PM

## 2019-09-02 NOTE — PCM.PN
- General Info


Date of Service: 09/02/19


Subjective Update: 





Mr. Hayes has remained stable since yesterday with no significant temperature 

elevation, vital signs have been stable. He has persistent erythema especially 

over the anterior aspect of the left lower leg.





- Review of Systems


General: Reports: Weakness.  Denies: Fever, Chills


Pulmonary: Reports: No Symptoms


Cardiovascular: Reports: No Symptoms


Gastrointestinal: Reports: No Symptoms


Musculoskeletal: Reports: Leg Pain


Skin: Reports: Other (Erythema lower leg)





- Patient Data


Vitals - Most Recent: 


 Last Vital Signs











Temp  97.2 F   09/02/19 11:43


 


Pulse  78   09/02/19 11:43


 


Resp  18   09/02/19 11:43


 


BP  134/53 L  09/02/19 11:43


 


Pulse Ox  100   09/02/19 11:43











Weight - Most Recent: 252 lb 9.6 oz


I&O - Last 24 Hours: 


 Intake & Output











 09/01/19 09/02/19 09/02/19





 22:59 06:59 14:59


 


Intake Total 250 55 890


 


Output Total 1065 683 4041


 


Balance -4200 -235 210











Lab Results Last 24 Hours: 


 Laboratory Results - last 24 hr











  09/02/19 09/02/19 09/02/19 Range/Units





  07:58 07:58 07:58 


 


WBC  4.0 L    (4.5-11.0)  K/uL


 


RBC  3.16 L    (4.30-5.90)  M/uL


 


Hgb  9.8 L    (12.0-15.0)  g/dL


 


Hct  31.8 L    (40.0-54.0)  %


 


MCV  101 H    (80-98)  fL


 


MCH  31    (27-31)  pg


 


MCHC  31 L    (32-36)  %


 


Plt Count  90 L    (150-400)  K/uL


 


Sodium   138 L   (140-148)  mmol/L


 


Potassium   5.0   (3.6-5.2)  mmol/L


 


Chloride   106   (100-108)  mmol/L


 


Carbon Dioxide   23   (21-32)  mmol/L


 


Anion Gap   14.0   (5.0-14.0)  mmol/L


 


BUN   14   (7-18)  mg/dL


 


Creatinine   1.0   (0.8-1.3)  mg/dL


 


Est Cr Clr Drug Dosing   72.16   mL/min


 


Estimated GFR (MDRD)   > 60   (>60)  


 


Glucose   117 H   ()  mg/dL


 


Calcium   8.8   (8.5-10.1)  mg/dL


 


Vancomycin Trough    22.5 H  (10.0-20.0)  ug/mL











Felix Results Last 24 Hours: 


 Microbiology











 08/30/19 18:30 Gram Stain - Final





 Leg, Left Wound Culture - Final





    Staphylococcus Aureus


 


 08/30/19 18:30 Aerobic Blood Culture - Preliminary





 Blood - Venous - Iv Start    NO GROWTH AFTER 2 DAYS





 Anaerobic Blood Culture - Preliminary





    NO GROWTH AFTER 2 DAYS


 


 08/30/19 18:35 Aerobic Blood Culture - Preliminary





 Blood - Venous - Iv Start    NO GROWTH AFTER 2 DAYS





 Anaerobic Blood Culture - Preliminary





    NO GROWTH AFTER 2 DAYS











Med Orders - Current: 


 Current Medications





Acetaminophen (Tylenol)  650 mg PO Q4H PRN


   PRN Reason: Pain (Mild 1-3)/fever


   Last Admin: 09/01/19 20:50 Dose:  650 mg


Aspirin (Halfprin)  81 mg PO DAILY [facility]


   Last Admin: 09/02/19 08:11 Dose:  81 mg


Cholestyramine Resin (Cholestyramine Packet)  4 gm PO BID [facility]


   Last Admin: 09/02/19 08:11 Dose:  4 gm


Fluticasone Propionate (Flonase)  0 gm NASBOTH DAILY [facility]


   Last Admin: 09/02/19 08:10 Dose:  1 spray


Hydroxyzine HCl (Atarax)  25 mg PO Q6H PRN


   PRN Reason: Itching


   Last Admin: 09/01/19 05:07 Dose:  25 mg


Ceftazidime 1 gm/ Sodium (Chloride)  50 mls @ 100 mls/hr IV Q8H [facility]


   Last Admin: 09/02/19 12:52 Dose:  100 mls/hr


Vancomycin HCl 1.5 gm/ Sodium (Chloride)  250 mls @ 150 mls/hr IV Q18H [facility]


Insulin Human Lispro (Humalog)  5 unit SUBCUT TIDMEALS [facility]


   Last Admin: 09/02/19 12:14 Dose:  5 units


Insulin Human Lispro (Humalog)  0 unit SUBCUT QIDACANDBED [facility]; Protocol


   Last Admin: 09/02/19 12:14 Dose:  1 unit


Lactobacillus Rhamnosus (Culturelle)  1 cap PO BID [facility]


   Last Admin: 09/02/19 08:11 Dose:  1 cap


Lisinopril (Prinivil)  10 mg PO DAILY [facility]


   Last Admin: 09/02/19 08:11 Dose:  10 mg


Lorazepam (Ativan)  0.5 mg IVPUSH Q4H PRN


   PRN Reason: Nausea/Vomiting


Magnesium Hydroxide (Milk Of Magnesia)  30 ml PO Q12H PRN


   PRN Reason: Constipation


Melatonin (Melatonin)  9 mg PO BEDTIME PRN


   PRN Reason: Sleep


Metformin HCl (Glucophage)  1,000 mg PO BIDMEALS [facility]


   Last Admin: 09/02/19 08:09 Dose:  1,000 mg


Ondansetron HCl (Zofran Odt)  4 mg PO Q6H PRN


   PRN Reason: Nausea able to take PO


Ondansetron HCl (Zofran)  4 mg IV Q6H PRN


   PRN Reason: Nausea/Vomiting


Propranolol HCl (Inderal La)  60 mg PO DAILY [facility]


   Last Admin: 09/02/19 08:11 Dose:  60 mg


Senna/Docusate Sodium (Senna Plus)  1 tab PO BID PRN


   PRN Reason: Constipation


Simvastatin (Zocor)  20 mg PO BEDTIME [facility]


   Last Admin: 09/01/19 20:24 Dose:  20 mg





Discontinued Medications





Enoxaparin Sodium (Lovenox)  40 mg SUBCUT DAILY [facility]


   Last Admin: 08/31/19 10:56 Dose:  Not Given


Furosemide (Lasix)  40 mg PO ONETIME ONE


   Stop: 09/01/19 13:06


   Last Admin: 09/01/19 13:25 Dose:  40 mg


Ertapenem 1 gm/ Sodium (Chloride)  100 mls @ 200 mls/hr IV ONETIME ONE


   Stop: 08/30/19 18:56


   Last Admin: 08/30/19 20:50 Dose:  200 mls/hr


Sodium Chloride (Normal Saline)  1,000 mls @ 500 mls/hr IV ASDIRECTED [facility]


   Last Admin: 08/30/19 18:55 Dose:  500 mls/hr


Vancomycin HCl 1 gm/ Sodium (Chloride)  250 mls @ 150 mls/hr IV ONETIME ONE


   Stop: 08/30/19 20:06


   Last Admin: 08/30/19 18:56 Dose:  150 mls/hr


Vancomycin HCl 1 gm/ Sodium (Chloride)  250 mls @ 150 mls/hr IV ONETIME ONE


   Stop: 08/30/19 21:10


   Last Admin: 08/30/19 22:15 Dose:  150 mls/hr


Piperacillin Sod/Tazobactam (Sod 3.375 gm/ Sodium Chloride)  50 mls @ 100 mls/

hr IV Q6H [facility]


Sodium Chloride (Normal Saline)  1,000 mls @ 125 mls/hr IV ASDIRECTED [facility]


   Last Admin: 08/31/19 06:37 Dose:  125 mls/hr


Vancomycin HCl 1.5 gm/ Sodium (Chloride)  250 mls @ 150 mls/hr IV Q12H [facility]


   Last Admin: 09/01/19 20:20 Dose:  150 mls/hr


Piperacillin/Tazobactam/ (Dextrose 3.375 gm/ Premix)  50 mls @ 100 mls/hr IV 

Q6H [facility]


   Last Admin: 09/01/19 07:27 Dose:  Not Given


Sodium Chloride (Normal Saline)  1,000 mls @ 25 mls/hr IV ASDIRECTED [facility]


   Last Admin: 09/01/19 07:53 Dose:  25 mls/hr











- Exam


Quality Assessment: DVT Prophylaxis


General: Alert, Oriented, Cooperative, Mild Distress


Lungs: Clear to Auscultation, Normal Respiratory Effort


Cardiovascular: Regular Rate, Regular Rhythm, No Murmurs


GI/Abdominal Exam: Soft, Non-Tender, No Organomegaly, No Distention


Extremities: Increased Warmth, Redness





- Problem List Review


Problem List Initiated/Reviewed/Updated: Yes





- My Orders


Last 24 Hours: 


My Active Orders





09/02/19 11:31


Extremity Non Vascular Lt [US] Urgent 





09/03/19 05:00


CBC WITH AUTO DIFF [HEME] Timed 














- Plan


Plan:: 





ASSESSMENT AND PLAN - 





Left lower extremity cellulitis - ongoing improvement but still a fair amount 

of erythema. Cultures have grown out staph aureus, methicillin sensitive


-Continue vancomycin and ceftazidime


-Ultrasound left lower leg to evaluate for abscess


-Pain control


-Trial of diuresis today





Insulin-dependent diabetes mellitus - blood sugar are much better with the 

drastic reductions in his home regimen. Glucose levels have remained stable 

over the last 24 hours


-Continue to hold long-acting insulin and glipizide


-Mealtime insulin 5 units


-Metformin


-Reassess additional management again tomorrow





Colon cancer - last chemotherapy was about one month ago. Recently told by the 

oncologist that he is cancer free at this time.





Essential hypertension - continue home medications





Maintenance issues - 


- DVT prophylaxis - enoxaparin


- GI prophylaxis - not indicated


- Nutrition - diabetic





Disposition - I would anticipate discharge home after the hospital stay





Primary care physician - [place], [place] Never smoker

## 2019-09-03 RX ADMIN — CHOLESTYRAMINE SCH GM: 4 POWDER, FOR SUSPENSION ORAL at 08:34

## 2019-09-03 RX ADMIN — PROPRANOLOL HYDROCHLORIDE SCH MG: 60 CAPSULE, EXTENDED RELEASE ORAL at 08:32

## 2019-09-03 RX ADMIN — ALBUTEROL SULFATE PRN MG: 2.5 SOLUTION RESPIRATORY (INHALATION) at 09:38

## 2019-09-03 RX ADMIN — Medication SCH CAP: at 08:33

## 2019-09-03 RX ADMIN — INSULIN LISPRO SCH UNITS: 100 INJECTION, SOLUTION INTRAVENOUS; SUBCUTANEOUS at 08:34

## 2019-09-03 RX ADMIN — INSULIN LISPRO SCH UNITS: 100 INJECTION, SOLUTION INTRAVENOUS; SUBCUTANEOUS at 11:30

## 2019-09-03 RX ADMIN — INSULIN LISPRO SCH: 100 INJECTION, SOLUTION INTRAVENOUS; SUBCUTANEOUS at 08:31

## 2019-09-03 RX ADMIN — INSULIN LISPRO SCH UNIT: 100 INJECTION, SOLUTION INTRAVENOUS; SUBCUTANEOUS at 17:37

## 2019-09-03 RX ADMIN — INSULIN LISPRO SCH UNIT: 100 INJECTION, SOLUTION INTRAVENOUS; SUBCUTANEOUS at 11:29

## 2019-09-03 RX ADMIN — Medication SCH CAP: at 20:17

## 2019-09-03 RX ADMIN — FLUTICASONE PROPIONATE SCH SPRAY: 50 SPRAY, METERED NASAL at 08:32

## 2019-09-03 RX ADMIN — INSULIN LISPRO SCH UNIT: 100 INJECTION, SOLUTION INTRAVENOUS; SUBCUTANEOUS at 21:10

## 2019-09-03 RX ADMIN — INSULIN LISPRO SCH UNITS: 100 INJECTION, SOLUTION INTRAVENOUS; SUBCUTANEOUS at 17:36

## 2019-09-03 RX ADMIN — CHOLESTYRAMINE SCH GM: 4 POWDER, FOR SUSPENSION ORAL at 20:17

## 2019-09-03 RX ADMIN — ALBUTEROL SULFATE PRN MG: 2.5 SOLUTION RESPIRATORY (INHALATION) at 15:53

## 2019-09-03 NOTE — PCM.PN
- General Info


Date of Service: 09/03/19


Subjective Update: 





Mr. Hayes has noted further improvement in the cellulitis since yesterday and 

significant decrease in edema. Vital signs have remained stable and he has been 

afebrile.


Functional Status: Reports: Tolerating Diet, Ambulating, Urinating





- Review of Systems


General: Reports: Weakness.  Denies: Fever, Chills


Cardiovascular: Reports: No Symptoms


Gastrointestinal: Reports: No Symptoms


Genitourinary: Reports: No Symptoms


Skin: Reports: Other (Persistent erythema anterior aspect left lower leg)





- Patient Data


Vitals - Most Recent: 


 Last Vital Signs











Temp  98.4 F   09/03/19 11:00


 


Pulse  96   09/03/19 11:00


 


Resp  18   09/03/19 11:00


 


BP  134/69   09/03/19 11:00


 


Pulse Ox  95   09/03/19 11:00











Weight - Most Recent: 252 lb 9.585 oz


I&O - Last 24 Hours: 


 Intake & Output











 09/02/19 09/03/19 09/03/19





 22:59 06:59 14:59


 


Intake Total 290  730


 


Output Total 3050 525 350


 


Balance -2760 -525 380











Lab Results Last 24 Hours: 


 Laboratory Results - last 24 hr











  09/03/19 Range/Units





  04:15 


 


WBC  3.4 L  (4.5-11.0)  K/uL


 


RBC  3.04 L  (4.30-5.90)  M/uL


 


Hgb  9.6 L  (12.0-15.0)  g/dL


 


Hct  30.4 L  (40.0-54.0)  %


 


MCV  100 H  (80-98)  fL


 


MCH  32 H  (27-31)  pg


 


MCHC  32  (32-36)  %


 


Plt Count  86 L  (150-400)  K/uL


 


Neut % (Auto)  61  (36-66)  %


 


Lymph % (Auto)  21 L  (24-44)  %


 


Mono % (Auto)  16 H  (2-6)  %


 


Eos % (Auto)  2  (2-4)  %


 


Baso % (Auto)  0  (0-1)  %











Felix Results Last 24 Hours: 


 Microbiology











 08/30/19 18:30 Aerobic Blood Culture - Preliminary





 Blood - Venous - Iv Start    NO GROWTH AFTER 3 DAYS





 Anaerobic Blood Culture - Preliminary





    NO GROWTH AFTER 3 DAYS


 


 08/30/19 18:35 Aerobic Blood Culture - Preliminary





 Blood - Venous - Iv Start    NO GROWTH AFTER 3 DAYS





 Anaerobic Blood Culture - Preliminary





    NO GROWTH AFTER 3 DAYS











Med Orders - Current: 


 Current Medications





Acetaminophen (Tylenol)  650 mg PO Q4H PRN


   PRN Reason: Pain (Mild 1-3)/fever


   Last Admin: 09/02/19 20:42 Dose:  650 mg


Albuterol (Proventil Neb Soln)  2.5 mg NEB Q4H PRN


   PRN Reason: Dyspnea


   Last Admin: 09/03/19 09:38 Dose:  2.5 mg


Aspirin (Halfprin)  81 mg PO DAILY [facility]


   Last Admin: 09/03/19 08:33 Dose:  81 mg


Cholestyramine Resin (Cholestyramine Packet)  4 gm PO BID [facility]


   Last Admin: 09/03/19 08:34 Dose:  4 gm


Fluticasone Propionate (Flonase)  0 gm NASBOTH DAILY [facility]


   Last Admin: 09/03/19 08:32 Dose:  1 spray


Furosemide (Lasix)  40 mg IVPUSH NOW ONE


   Stop: 09/03/19 12:24


Hydroxyzine HCl (Atarax)  25 mg PO Q6H PRN


   PRN Reason: Itching


   Last Admin: 09/01/19 05:07 Dose:  25 mg


Ceftazidime 1 gm/ Sodium (Chloride)  50 mls @ 100 mls/hr IV Q8H [facility]


   Last Admin: 09/03/19 11:59 Dose:  100 mls/hr


Vancomycin HCl 1.5 gm/ Sodium (Chloride)  250 mls @ 150 mls/hr IV Q18H [facility]


   Last Admin: 09/03/19 08:50 Dose:  150 mls/hr


Insulin Human Lispro (Humalog)  5 unit SUBCUT TIDMEALS [facility]


   Last Admin: 09/03/19 11:30 Dose:  5 units


Insulin Human Lispro (Humalog)  0 unit SUBCUT QIDACANDBED [facility]; Protocol


   Last Admin: 09/03/19 11:29 Dose:  2 unit


Lactobacillus Rhamnosus (Culturelle)  1 cap PO BID [facility]


   Last Admin: 09/03/19 08:33 Dose:  1 cap


Lisinopril (Prinivil)  10 mg PO DAILY [facility]


   Last Admin: 09/03/19 08:33 Dose:  10 mg


Lorazepam (Ativan)  0.5 mg IVPUSH Q4H PRN


   PRN Reason: Nausea/Vomiting


Magnesium Hydroxide (Milk Of Magnesia)  30 ml PO Q12H PRN


   PRN Reason: Constipation


Melatonin (Melatonin)  9 mg PO BEDTIME PRN


   PRN Reason: Sleep


Metformin HCl (Glucophage)  1,000 mg PO BIDMEALS [facility]


   Last Admin: 09/03/19 08:33 Dose:  1,000 mg


Ondansetron HCl (Zofran Odt)  4 mg PO Q6H PRN


   PRN Reason: Nausea able to take PO


Ondansetron HCl (Zofran)  4 mg IV Q6H PRN


   PRN Reason: Nausea/Vomiting


Propranolol HCl (Inderal La)  60 mg PO DAILY [facility]


   Last Admin: 09/03/19 08:32 Dose:  60 mg


Senna/Docusate Sodium (Senna Plus)  1 tab PO BID PRN


   PRN Reason: Constipation


Simvastatin (Zocor)  20 mg PO BEDTIME [facility]


   Last Admin: 09/02/19 20:42 Dose:  20 mg





Discontinued Medications





Enoxaparin Sodium (Lovenox)  40 mg SUBCUT DAILY [facility]


   Last Admin: 08/31/19 10:56 Dose:  Not Given


Furosemide (Lasix)  40 mg PO ONETIME ONE


   Stop: 09/01/19 13:06


   Last Admin: 09/01/19 13:25 Dose:  40 mg


Furosemide (Lasix)  40 mg IVPUSH NOW ONE


   Stop: 09/02/19 17:00


   Last Admin: 09/02/19 17:30 Dose:  40 mg


Ertapenem 1 gm/ Sodium (Chloride)  100 mls @ 200 mls/hr IV ONETIME ONE


   Stop: 08/30/19 18:56


   Last Admin: 08/30/19 20:50 Dose:  200 mls/hr


Sodium Chloride (Normal Saline)  1,000 mls @ 500 mls/hr IV ASDIRECTED [facility]


   Last Admin: 08/30/19 18:55 Dose:  500 mls/hr


Vancomycin HCl 1 gm/ Sodium (Chloride)  250 mls @ 150 mls/hr IV ONETIME ONE


   Stop: 08/30/19 20:06


   Last Admin: 08/30/19 18:56 Dose:  150 mls/hr


Vancomycin HCl 1 gm/ Sodium (Chloride)  250 mls @ 150 mls/hr IV ONETIME ONE


   Stop: 08/30/19 21:10


   Last Admin: 08/30/19 22:15 Dose:  150 mls/hr


Piperacillin Sod/Tazobactam (Sod 3.375 gm/ Sodium Chloride)  50 mls @ 100 mls/

hr IV Q6H [facility]


Sodium Chloride (Normal Saline)  1,000 mls @ 125 mls/hr IV ASDIRECTED [facility]


   Last Admin: 08/31/19 06:37 Dose:  125 mls/hr


Vancomycin HCl 1.5 gm/ Sodium (Chloride)  250 mls @ 150 mls/hr IV Q12H [facility]


   Last Admin: 09/01/19 20:20 Dose:  150 mls/hr


Piperacillin/Tazobactam/ (Dextrose 3.375 gm/ Premix)  50 mls @ 100 mls/hr IV 

Q6H [facility]


   Last Admin: 09/01/19 07:27 Dose:  Not Given


Sodium Chloride (Normal Saline)  1,000 mls @ 25 mls/hr IV ASDIRECTED [facility]


   Last Admin: 09/01/19 07:53 Dose:  25 mls/hr











- Exam


General: Alert, Oriented, Cooperative, Mild Distress


Lungs: Clear to Auscultation, Normal Respiratory Effort


Cardiovascular: Regular Rate, Regular Rhythm, No Murmurs


GI/Abdominal Exam: Soft, Non-Tender, No Organomegaly, No Distention


Extremities: Redness (Good improvement in erythema and swelling left lower leg 

over the last 24 hours)





- Problem List Review


Problem List Initiated/Reviewed/Updated: Yes





- My Orders


Last 24 Hours: 


My Active Orders





09/02/19 15:38


RT Aerosol Therapy [RC] ASDIRECTED 


Albuterol [Proventil Neb Soln]   2.5 mg NEB Q4H PRN 





09/03/19 12:23


Furosemide [Lasix]   40 mg IVPUSH NOW ONE 





09/04/19 05:00


BASIC METABOLIC PANEL,BMP [CHEM] Timed 


CBC WITH AUTO DIFF [HEME] Timed 














- Plan


Plan:: 





ASSESSMENT AND PLAN - 





Left lower extremity cellulitis - good improvement in erythema and swelling 

over the last 24 hours. Ultrasound obtained yesterday shows no evidence of 

underlying abscess


-Repeat furosemide 40 mg IV today


-Continue vancomycin and ceftazidime





Insulin-dependent diabetes mellitus - blood sugar are much better with the 

drastic reductions in his home regimen. Glucose levels have remained stable 

over the last 24 hours


-Continue to hold long-acting insulin and glipizide


-Mealtime insulin 5 units


-Metformin


-Reassess additional management again tomorrow





Colon cancer - last chemotherapy was about one month ago. Recently told by the 

oncologist that he is cancer free at this time.





Essential hypertension - continue home medications





Maintenance issues - 


- DVT prophylaxis - enoxaparin


- GI prophylaxis - not indicated


- Nutrition - diabetic





Disposition - I would anticipate discharge home after the hospital stay





Primary care physician - Carla [place]oyal

## 2019-09-04 RX ADMIN — INSULIN LISPRO SCH UNIT: 100 INJECTION, SOLUTION INTRAVENOUS; SUBCUTANEOUS at 11:35

## 2019-09-04 RX ADMIN — INSULIN LISPRO SCH UNITS: 100 INJECTION, SOLUTION INTRAVENOUS; SUBCUTANEOUS at 11:35

## 2019-09-04 RX ADMIN — INSULIN LISPRO SCH UNIT: 100 INJECTION, SOLUTION INTRAVENOUS; SUBCUTANEOUS at 07:43

## 2019-09-04 RX ADMIN — PROPRANOLOL HYDROCHLORIDE SCH MG: 60 CAPSULE, EXTENDED RELEASE ORAL at 08:21

## 2019-09-04 RX ADMIN — INSULIN LISPRO SCH UNITS: 100 INJECTION, SOLUTION INTRAVENOUS; SUBCUTANEOUS at 07:43

## 2019-09-04 RX ADMIN — ALBUTEROL SULFATE PRN MG: 2.5 SOLUTION RESPIRATORY (INHALATION) at 10:45

## 2019-09-04 RX ADMIN — CHOLESTYRAMINE SCH GM: 4 POWDER, FOR SUSPENSION ORAL at 08:20

## 2019-09-04 RX ADMIN — Medication SCH CAP: at 08:20

## 2019-09-04 RX ADMIN — FLUTICASONE PROPIONATE SCH SPRAY: 50 SPRAY, METERED NASAL at 08:20

## 2019-09-04 NOTE — [id number]
**Discharge Summary





- Hospital Course


Brief History: Mr. Hayes is a 73-year-old gentleman who was admitted through 

the emergency department with swelling and erythema of his left lower leg 

secondary to underlying cellulitis.





- Discharge Data


Discharge Date: 09/04/19


Discharge Disposition: Home, Self-Care 01


Condition: Stable





- Discharge Diagnosis/Problem(s)


(1) Cellulitis of left lower extremity


SNOMED Code(s): 515130863


   ICD Code: L03.116 - CELLULITIS OF LEFT LOWER LIMB   Status: Acute   Current 

Visit: Yes   





(2) Colon cancer


SNOMED Code(s): 305503511


   ICD Code: C18.9 - MALIGNANT NEOPLASM OF COLON, UNSPECIFIED   Status: Chronic

   Current Visit: Yes   


Qualifiers: 


   Colon location: unspecified part of colon   Qualified Code(s): C18.9 - 

Malignant neoplasm of colon, unspecified   





(3) Type 2 diabetes mellitus


SNOMED Code(s): 01990715


   ICD Code: E11.9 - TYPE 2 DIABETES MELLITUS WITHOUT COMPLICATIONS   Status: 

Chronic   Current Visit: No   





- Patient Summary/Data


Hospital Course: 





Mr. Hayes presented to the emergency room with left lower extremity swelling, 

redness and drainage. He thinks he bumped his anterior shin a couple of days 

ago but has not had any significant pain. Yesterday morning he noticed that his 

left leg was a little swollen and slightly red in the morning but did not pay 

attention to it throughout the day. He has not had any pain in the leg. He 

thought it was a little bit more swollen and red this morning. This afternoon 

it was itchy so he had some look at and they told him to get checked out right 

away with concerns for cellulitis. He reports itchy skin throughout the lower 

leg especially anteriorly. He does not have any numbness or tingling. He has 

had chills that started last night but has not measured any fevers at home. He 

feels weak and tired but otherwise feels okay.  Workup in the emergency room 

revealed fairly normal labs. Physical examination revealed obvious cellulitis 

involving almost the entire left leg below the knee. He does have an open 

weeping area on the anterior shin that was cultured. He has received vancomycin 

and ertapenem. Cultures have been obtained. He is receiving some IV fluids. He 

will be admitted for further management.





On admission he was placed on IV vancomycin and Zosyn as well as IV fluids for 

hydration and pain medication. Cultures from the wound later grew out staph 

aureus which was sensitive to most antibiotics. He appeared to have possible 

reaction to the Zosyn during hospital stay so this was discontinued and he was 

transitioned to IV ceftazedime. It was significant swelling in the leg and he 

did receive diuretic therapy as well. Over the course of his hospital stay 

cellulitis improved and was essentially resolved or close to resolved by the 

time of discharge. Blood glucose levels were initially noted to be low and his 

diabetes medications were adjusted significantly to accommodate the 

hypoglycemia. Dose of metformin will be decreased to 500 mg twice daily on 

discharge. Lantus insulin will be decreased from 55 units to 30 units once 

daily. Short acting insulin will be 5 units 3 times daily with each meal. 

Glipizide and Trulicity were discontinued and will not be resumed at the time 

of discharge. He will be discharged to home on 5 additional days of oral 

antibiotic therapy with ciprofloxacin 500 mg twice daily. Activity will be as 

tolerated and he will remain on a diabetic diet. Follow-up appointment will be 

scheduled with his primary care provider within 2 weeks.





- Patient Instructions


Diet: Diabetic Diet


Activity: As Tolerated


Other/Special Instructions: Please schedule follow-up appointment with primary 

care provider within 2 weeks





- Discharge Plan


*PRESCRIPTION DRUG MONITORING PROGRAM REVIEWED*: Not Applicable


*COPY OF PRESCRIPTION DRUG MONITORING REPORT IN PATIENT BINH: Not Applicable


Prescriptions/Med Rec: 


Ciprofloxacin [Ciprofloxacin HCl] 500 mg PO BID #10 tab


Lactobacillus Rhamnosus GG [Culturelle] 1 cap PO BID #60 cap


metFORMIN [Glucophage] 500 mg PO BIDMEALS #60 tablet


Home Medications: 


 Home Meds





Aspirin [Ecotrin EC] 81 mg PO DAILY 08/30/19 [History]


Cholestyramine/Aspartame [Prevalite Powder] 4 gm PO BID 08/30/19 [History]


Fluticasone Propionate [Flonase] 16 gm NS ASDIRECTED 08/30/19 [History]


Lisinopril 10 mg PO DAILY 08/30/19 [History]


Propranolol [Inderal LA] 60 mg PO DAILY 08/30/19 [History]


Simvastatin [Zocor] 20 mg PO BEDTIME 08/30/19 [History]


metFORMIN [Glucophage] 1,000 mg PO BIDMEALS 08/30/19 [History]


Ciprofloxacin [Ciprofloxacin HCl] 500 mg PO BID #10 tab 09/04/19 [Rx]


Insulin Degludec [Tresiba Flextouch U-100] 30 unit SQ DAILY #0 09/04/19 [Rx]


Lactobacillus Rhamnosus GG [Culturelle] 1 cap PO BID #60 cap 09/04/19 [Rx]


metFORMIN [Glucophage] 500 mg PO BIDMEALS #60 tablet 09/04/19 [Rx]








Patient Handouts:  Cellulitis, Adult, Easy-to-Read





- Discharge Summary/Plan Comment


DC Time >30 min.: No





- Patient Data


Vitals - Most Recent: 


 Last Vital Signs











Temp  98.6 F   09/04/19 08:04


 


Pulse  86   09/04/19 08:04


 


Resp  20   09/04/19 08:04


 


BP  119/66   09/04/19 08:21


 


Pulse Ox  95   09/04/19 08:04











Weight - Most Recent: 252 lb 9.585 oz


I&O - Last 24 hours: 


 Intake & Output











 09/03/19 09/04/19 09/04/19





 22:59 06:59 14:59


 


Intake Total 


 


Output Total 750 800 500


 


Balance -340 -500 1000











Lab Results - Last 24 hrs: 


 Laboratory Results - last 24 hr











  09/04/19 09/04/19 Range/Units





  05:40 05:40 


 


WBC  4.1 L   (4.5-11.0)  K/uL


 


RBC  2.96 L   (4.30-5.90)  M/uL


 


Hgb  9.6 L   (12.0-15.0)  g/dL


 


Hct  29.6 L   (40.0-54.0)  %


 


MCV  100 H   (80-98)  fL


 


MCH  32 H   (27-31)  pg


 


MCHC  32   (32-36)  %


 


Plt Count  84 L   (150-400)  K/uL


 


Neut % (Auto)  66   (36-66)  %


 


Lymph % (Auto)  18 L   (24-44)  %


 


Mono % (Auto)  15 H   (2-6)  %


 


Eos % (Auto)  2   (2-4)  %


 


Baso % (Auto)  0   (0-1)  %


 


Sodium   140  (140-148)  mmol/L


 


Potassium   4.5  (3.6-5.2)  mmol/L


 


Chloride   107  (100-108)  mmol/L


 


Carbon Dioxide   26  (21-32)  mmol/L


 


Anion Gap   6.9  (5.0-14.0)  mmol/L


 


BUN   15  (7-18)  mg/dL


 


Creatinine   0.9  (0.8-1.3)  mg/dL


 


Est Cr Clr Drug Dosing   79.85  mL/min


 


Estimated GFR (MDRD)   > 60  (>60)  


 


Glucose   144 H  ()  mg/dL


 


Calcium   8.5  (8.5-10.1)  mg/dL











LASHA Results - Last 24 hrs: 


 Microbiology











 08/30/19 18:30 Aerobic Blood Culture - Preliminary





 Blood - Venous - Iv Start    NO GROWTH AFTER 4 DAYS





 Anaerobic Blood Culture - Preliminary





    NO GROWTH AFTER 4 DAYS


 


 08/30/19 18:35 Aerobic Blood Culture - Preliminary





 Blood - Venous - Iv Start    NO GROWTH AFTER 4 DAYS





 Anaerobic Blood Culture - Preliminary





    NO GROWTH AFTER 4 DAYS











Med Orders - Current: 


 Current Medications





Acetaminophen (Tylenol)  650 mg PO Q4H PRN


   PRN Reason: Pain (Mild 1-3)/fever


   Last Admin: 09/03/19 17:42 Dose:  650 mg


Albuterol (Proventil Neb Soln)  2.5 mg NEB Q4H PRN


   PRN Reason: Dyspnea


   Last Admin: 09/04/19 10:45 Dose:  2.5 mg


Aspirin (Halfprin)  81 mg PO DAILY [facility]


   Last Admin: 09/04/19 08:21 Dose:  81 mg


Cholestyramine Resin (Cholestyramine Packet)  4 gm PO BID [facility]


   Last Admin: 09/04/19 08:20 Dose:  4 gm


Fluticasone Propionate (Flonase)  0 gm NASBOTH DAILY [facility]


   Last Admin: 09/04/19 08:20 Dose:  1 spray


Hydroxyzine HCl (Atarax)  25 mg PO Q6H PRN


   PRN Reason: Itching


   Last Admin: 09/01/19 05:07 Dose:  25 mg


Ceftazidime 1 gm/ Sodium (Chloride)  50 mls @ 100 mls/hr IV Q8H [facility]


   Last Admin: 09/04/19 04:49 Dose:  100 mls/hr


Vancomycin HCl 1.5 gm/ Sodium (Chloride)  250 mls @ 150 mls/hr IV Q18H [facility]


   Last Admin: 09/04/19 02:49 Dose:  150 mls/hr


Insulin Human Lispro (Humalog)  5 unit SUBCUT TIDMEALS [facility]


   Last Admin: 09/04/19 11:35 Dose:  5 units


Insulin Human Lispro (Humalog)  0 unit SUBCUT QIDACANDBED [facility]; Protocol


   Last Admin: 09/04/19 11:35 Dose:  3 unit


Lactobacillus Rhamnosus (Culturelle)  1 cap PO BID [facility]


   Last Admin: 09/04/19 08:20 Dose:  1 cap


Lisinopril (Prinivil)  10 mg PO DAILY [facility]


   Last Admin: 09/04/19 08:21 Dose:  10 mg


Lorazepam (Ativan)  0.5 mg IVPUSH Q4H PRN


   PRN Reason: Nausea/Vomiting


Magnesium Hydroxide (Milk Of Magnesia)  30 ml PO Q12H PRN


   PRN Reason: Constipation


Melatonin (Melatonin)  9 mg PO BEDTIME PRN


   PRN Reason: Sleep


Metformin HCl (Glucophage)  1,000 mg PO BIDMEALS [facility]


   Last Admin: 09/04/19 07:42 Dose:  1,000 mg


Ondansetron HCl (Zofran Odt)  4 mg PO Q6H PRN


   PRN Reason: Nausea able to take PO


Ondansetron HCl (Zofran)  4 mg IV Q6H PRN


   PRN Reason: Nausea/Vomiting


Propranolol HCl (Inderal La)  60 mg PO DAILY [facility]


   Last Admin: 09/04/19 08:21 Dose:  60 mg


Senna/Docusate Sodium (Senna Plus)  1 tab PO BID PRN


   PRN Reason: Constipation


Simvastatin (Zocor)  20 mg PO BEDTIME [facility]


   Last Admin: 09/03/19 20:17 Dose:  20 mg





Discontinued Medications





Enoxaparin Sodium (Lovenox)  40 mg SUBCUT DAILY [facility]


   Last Admin: 08/31/19 10:56 Dose:  Not Given


Furosemide (Lasix)  40 mg PO ONETIME ONE


   Stop: 09/01/19 13:06


   Last Admin: 09/01/19 13:25 Dose:  40 mg


Furosemide (Lasix)  40 mg IVPUSH NOW ONE


   Stop: 09/02/19 17:00


   Last Admin: 09/02/19 17:30 Dose:  40 mg


Furosemide (Lasix)  40 mg IVPUSH NOW ONE


   Stop: 09/03/19 12:24


   Last Admin: 09/03/19 13:27 Dose:  40 mg


Ertapenem 1 gm/ Sodium (Chloride)  100 mls @ 200 mls/hr IV ONETIME ONE


   Stop: 08/30/19 18:56


   Last Admin: 08/30/19 20:50 Dose:  200 mls/hr


Sodium Chloride (Normal Saline)  1,000 mls @ 500 mls/hr IV ASDIRECTED [facility]


   Last Admin: 08/30/19 18:55 Dose:  500 mls/hr


Vancomycin HCl 1 gm/ Sodium (Chloride)  250 mls @ 150 mls/hr IV ONETIME ONE


   Stop: 08/30/19 20:06


   Last Admin: 08/30/19 18:56 Dose:  150 mls/hr


Vancomycin HCl 1 gm/ Sodium (Chloride)  250 mls @ 150 mls/hr IV ONETIME ONE


   Stop: 08/30/19 21:10


   Last Admin: 08/30/19 22:15 Dose:  150 mls/hr


Piperacillin Sod/Tazobactam (Sod 3.375 gm/ Sodium Chloride)  50 mls @ 100 mls/

hr IV Q6H [facility]


Sodium Chloride (Normal Saline)  1,000 mls @ 125 mls/hr IV ASDIRECTED [facility]


   Last Admin: 08/31/19 06:37 Dose:  125 mls/hr


Vancomycin HCl 1.5 gm/ Sodium (Chloride)  250 mls @ 150 mls/hr IV Q12H [facility]


   Last Admin: 09/01/19 20:20 Dose:  150 mls/hr


Piperacillin/Tazobactam/ (Dextrose 3.375 gm/ Premix)  50 mls @ 100 mls/hr IV 

Q6H [facility]


   Last Admin: 09/01/19 07:27 Dose:  Not Given


Sodium Chloride (Normal Saline)  1,000 mls @ 25 mls/hr IV ASDIRECTED [facility]


   Last Admin: 09/01/19 07:53 Dose:  25 mls/hr











*Q Meaningful Use (DIS)





- VTE *Q


VTE Mechanical Contraindications *Q: Bilateral Lower Edema

## 2023-08-06 ENCOUNTER — HOSPITAL ENCOUNTER (INPATIENT)
Dept: HOSPITAL 11 - JP.ED | Age: 78
LOS: 3 days | Discharge: HOME | DRG: 683 | End: 2023-08-09
Attending: INTERNAL MEDICINE | Admitting: INTERNAL MEDICINE
Payer: MEDICARE

## 2023-08-06 DIAGNOSIS — Z87.81: ICD-10-CM

## 2023-08-06 DIAGNOSIS — M25.551: ICD-10-CM

## 2023-08-06 DIAGNOSIS — E11.621: ICD-10-CM

## 2023-08-06 DIAGNOSIS — Z79.4: ICD-10-CM

## 2023-08-06 DIAGNOSIS — Z90.49: ICD-10-CM

## 2023-08-06 DIAGNOSIS — E86.0: ICD-10-CM

## 2023-08-06 DIAGNOSIS — Z20.822: ICD-10-CM

## 2023-08-06 DIAGNOSIS — D64.9: ICD-10-CM

## 2023-08-06 DIAGNOSIS — E87.20: ICD-10-CM

## 2023-08-06 DIAGNOSIS — D64.89: ICD-10-CM

## 2023-08-06 DIAGNOSIS — Z98.890: ICD-10-CM

## 2023-08-06 DIAGNOSIS — Z79.899: ICD-10-CM

## 2023-08-06 DIAGNOSIS — Z96.649: ICD-10-CM

## 2023-08-06 DIAGNOSIS — I10: ICD-10-CM

## 2023-08-06 DIAGNOSIS — L97.429: ICD-10-CM
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
## 2023-08-06 DIAGNOSIS — E11.9: ICD-10-CM

## 2023-08-06 DIAGNOSIS — K42.9: ICD-10-CM

## 2023-08-06 DIAGNOSIS — E87.5: ICD-10-CM

## 2023-08-06 DIAGNOSIS — E11.42: ICD-10-CM

## 2023-08-06 DIAGNOSIS — N17.9: Primary | ICD-10-CM

## 2023-08-06 DIAGNOSIS — Z79.82: ICD-10-CM

## 2023-08-06 DIAGNOSIS — E78.00: ICD-10-CM

## 2023-08-06 DIAGNOSIS — M25.562: ICD-10-CM

## 2023-08-06 DIAGNOSIS — R07.81: ICD-10-CM

## 2023-08-06 DIAGNOSIS — M25.561: ICD-10-CM

## 2023-08-06 LAB
ALBUMIN SERPL-MCNC: 2.1 G/DL (ref 3.4–5)
ALBUMIN/GLOB SERPL: 0.7 {RATIO} (ref 1.2–2.2)
ALP SERPL-CCNC: 161 U/L (ref 46–116)
ALT SERPL-CCNC: 35 U/L (ref 12–78)
ANION GAP SERPL CALC-SCNC: 18.8 MMOL/L (ref 5–14)
AST SERPL-CCNC: 53 U/L (ref 15–37)
BILIRUB SERPL-MCNC: 0.9 MG/DL (ref 0.2–1)
BUN SERPL-MCNC: 38 MG/DL (ref 7–18)
CALCIUM SERPL-MCNC: 7.9 MG/DL (ref 8.5–10.1)
CHLORIDE SERPL-SCNC: 108 MMOL/L (ref 100–108)
CK SERPL-CCNC: 53 U/L (ref 39–308)
CO2 SERPL-SCNC: 16 MMOL/L (ref 21–32)
CREAT CL 24H UR+SERPL-VRATE: 22.87 ML/MIN
CREAT SERPL-MCNC: 2.7 MG/DL (ref 0.8–1.3)
CRP SERPL-MCNC: 1.29 MG/DL (ref 0–0.3)
FERRITIN SERPL-MCNC: 280 NG/ML (ref 8–388)
FOLATE SERPL-MCNC: 9.4 NG/ML (ref 8.6–58.9)
GLOBULIN SER-MCNC: 3.2 G/DL (ref 2.3–3.5)
GLUCOSE SERPL-MCNC: 346 MG/DL (ref 74–106)
HCT VFR BLD AUTO: 20 % (ref 38.4–49.7)
HGB BLD-MCNC: 6.7 G/DL (ref 12.9–16.9)
IRON SATN MFR SERPL: 32 % (ref 20–55)
IRON SERPL-MCNC: 70 UG/DL (ref 65–175)
LDH SERPL-CCNC: 207 U/L (ref 85–227)
LIPASE SERPL-CCNC: 51 U/L (ref 16–77)
MCH RBC QN AUTO: 32.4 PG (ref 31.6–35.5)
MCHC RBC AUTO-ENTMCNC: 33.5 G/DL (ref 31.6–35.5)
MCHC RBC AUTO-ENTMCNC: 96.6 FL (ref 81.4–99)
PLATELET # BLD AUTO: 148 K/UL (ref 130–375)
POTASSIUM SERPL-SCNC: 6.8 MMOL/L (ref 3.6–5.2)
PROT SERPL-MCNC: 5.3 G/DL (ref 6.4–8.2)
RBC # BLD AUTO: 2.07 M/UL (ref 4.14–5.76)
RETICS/RBC NFR AUTO: 3.19 % (ref 0.03–0.11)
SODIUM SERPL-SCNC: 136 MMOL/L (ref 140–148)
TIBC SERPL-MCNC: 220 UG/DL (ref 250–450)
VIT B12 SERPL-MCNC: 484 PG/ML (ref 193–986)
WBC # BLD AUTO: 12.5 K/UL (ref 3.2–11)

## 2023-08-06 PROCEDURE — C9113 INJ PANTOPRAZOLE SODIUM, VIA: HCPCS

## 2023-08-06 PROCEDURE — 83735 ASSAY OF MAGNESIUM: CPT

## 2023-08-06 PROCEDURE — U0002 COVID-19 LAB TEST NON-CDC: HCPCS

## 2023-08-06 PROCEDURE — 80053 COMPREHEN METABOLIC PANEL: CPT

## 2023-08-06 PROCEDURE — 73502 X-RAY EXAM HIP UNI 2-3 VIEWS: CPT

## 2023-08-06 PROCEDURE — 86922 COMPATIBILITY TEST ANTIGLOB: CPT

## 2023-08-06 PROCEDURE — 86901 BLOOD TYPING SEROLOGIC RH(D): CPT

## 2023-08-06 PROCEDURE — 93010 ELECTROCARDIOGRAM REPORT: CPT

## 2023-08-06 PROCEDURE — 83690 ASSAY OF LIPASE: CPT

## 2023-08-06 PROCEDURE — 73560 X-RAY EXAM OF KNEE 1 OR 2: CPT

## 2023-08-06 PROCEDURE — 36415 COLL VENOUS BLD VENIPUNCTURE: CPT

## 2023-08-06 PROCEDURE — 86920 COMPATIBILITY TEST SPIN: CPT

## 2023-08-06 PROCEDURE — P9016 RBC LEUKOCYTES REDUCED: HCPCS

## 2023-08-06 PROCEDURE — 30233N1 TRANSFUSION OF NONAUTOLOGOUS RED BLOOD CELLS INTO PERIPHERAL VEIN, PERCUTANEOUS APPROACH: ICD-10-PCS | Performed by: INTERNAL MEDICINE

## 2023-08-06 PROCEDURE — 86900 BLOOD TYPING SEROLOGIC ABO: CPT

## 2023-08-06 PROCEDURE — 99285 EMERGENCY DEPT VISIT HI MDM: CPT

## 2023-08-06 PROCEDURE — 86140 C-REACTIVE PROTEIN: CPT

## 2023-08-06 PROCEDURE — 82550 ASSAY OF CK (CPK): CPT

## 2023-08-06 PROCEDURE — 96360 HYDRATION IV INFUSION INIT: CPT

## 2023-08-06 PROCEDURE — 93005 ELECTROCARDIOGRAM TRACING: CPT

## 2023-08-06 PROCEDURE — 85027 COMPLETE CBC AUTOMATED: CPT

## 2023-08-06 PROCEDURE — 86850 RBC ANTIBODY SCREEN: CPT

## 2023-08-06 PROCEDURE — 94640 AIRWAY INHALATION TREATMENT: CPT

## 2023-08-06 PROCEDURE — 70450 CT HEAD/BRAIN W/O DYE: CPT

## 2023-08-06 PROCEDURE — 71100 X-RAY EXAM RIBS UNI 2 VIEWS: CPT

## 2023-08-06 PROCEDURE — 71046 X-RAY EXAM CHEST 2 VIEWS: CPT

## 2023-08-06 RX ADMIN — MAGNESIUM SULFATE IN WATER SCH MLS/HR: 40 INJECTION, SOLUTION INTRAVENOUS at 15:16

## 2023-08-06 RX ADMIN — INSULIN LISPRO SCH UNITS: 100 INJECTION, SOLUTION INTRAVENOUS; SUBCUTANEOUS at 17:26

## 2023-08-06 RX ADMIN — MAGNESIUM SULFATE IN WATER SCH MLS/HR: 40 INJECTION, SOLUTION INTRAVENOUS at 20:58

## 2023-08-06 RX ADMIN — INSULIN LISPRO SCH UNITS: 100 INJECTION, SOLUTION INTRAVENOUS; SUBCUTANEOUS at 21:02

## 2023-08-07 LAB
ANION GAP SERPL CALC-SCNC: 13.8 MMOL/L (ref 5–14)
BASOPHILS # BLD AUTO: 0.02 K/UL (ref 0–0.1)
BASOPHILS NFR BLD AUTO: 0.4 % (ref 0.1–1.3)
BUN SERPL-MCNC: 45 MG/DL (ref 7–18)
CALCIUM SERPL-MCNC: 7.6 MG/DL (ref 8.5–10.1)
CHLORIDE SERPL-SCNC: 110 MMOL/L (ref 100–108)
CO2 SERPL-SCNC: 19 MMOL/L (ref 21–32)
CREAT CL 24H UR+SERPL-VRATE: 23.84 ML/MIN
CREAT SERPL-MCNC: 2.6 MG/DL (ref 0.8–1.3)
EOSINOPHIL # BLD AUTO: 0.05 K/UL (ref 0–0.4)
EOSINOPHIL NFR BLD AUTO: 0.9 % (ref 0–5.4)
GLUCOSE SERPL-MCNC: 170 MG/DL (ref 74–106)
HCT VFR BLD AUTO: 21.2 % (ref 38.4–49.7)
HGB BLD-MCNC: 7.4 G/DL (ref 12.9–16.9)
IMM GRANULOCYTES # BLD: 0.03 K/UL (ref 0–0.23)
IMM GRANULOCYTES NFR BLD: 0.5 % (ref 0–0.7)
LYMPHOCYTES # BLD AUTO: 0.8 K/UL (ref 0.8–3.3)
LYMPHOCYTES NFR BLD AUTO: 14.3 % (ref 11.4–47.7)
MAGNESIUM SERPL-MCNC: 2.7 MG/DL (ref 1.8–2.4)
MCH RBC QN AUTO: 31.9 PG (ref 31.6–35.5)
MCHC RBC AUTO-ENTMCNC: 34.9 G/DL (ref 31.6–35.5)
MCHC RBC AUTO-ENTMCNC: 91.4 FL (ref 81.4–99)
MONOCYTES # BLD AUTO: 0.72 K/UL (ref 0.2–0.9)
MONOCYTES NFR BLD AUTO: 12.8 % (ref 3.3–12.6)
NEUTROPHILS # BLD AUTO: 3.99 K/UL (ref 1–7.6)
NEUTROPHILS NFR BLD AUTO: 71.1 % (ref 40–78.1)
PLATELET # BLD AUTO: 72 K/UL (ref 130–375)
POTASSIUM SERPL-SCNC: 4.8 MMOL/L (ref 3.6–5.2)
RBC # BLD AUTO: 2.32 M/UL (ref 4.14–5.76)
SODIUM SERPL-SCNC: 138 MMOL/L (ref 140–148)
WBC # BLD AUTO: 5.6 K/UL (ref 3.2–11)

## 2023-08-07 PROCEDURE — 0DD48ZX EXTRACTION OF ESOPHAGOGASTRIC JUNCTION, VIA NATURAL OR ARTIFICIAL OPENING ENDOSCOPIC, DIAGNOSTIC: ICD-10-PCS | Performed by: INTERNAL MEDICINE

## 2023-08-07 RX ADMIN — MAGNESIUM SULFATE IN WATER SCH MLS/HR: 40 INJECTION, SOLUTION INTRAVENOUS at 04:07

## 2023-08-07 RX ADMIN — INSULIN LISPRO SCH UNITS: 100 INJECTION, SOLUTION INTRAVENOUS; SUBCUTANEOUS at 21:24

## 2023-08-07 RX ADMIN — INSULIN LISPRO SCH UNITS: 100 INJECTION, SOLUTION INTRAVENOUS; SUBCUTANEOUS at 17:11

## 2023-08-07 RX ADMIN — INSULIN LISPRO SCH UNITS: 100 INJECTION, SOLUTION INTRAVENOUS; SUBCUTANEOUS at 08:19

## 2023-08-07 RX ADMIN — INSULIN LISPRO SCH UNITS: 100 INJECTION, SOLUTION INTRAVENOUS; SUBCUTANEOUS at 12:10

## 2023-08-07 RX ADMIN — PROPRANOLOL HYDROCHLORIDE SCH MG: 60 CAPSULE, EXTENDED RELEASE ORAL at 09:39

## 2023-08-07 RX ADMIN — BACITRACIN ZINC SCH DOSE: 500 OINTMENT TOPICAL at 09:39

## 2023-08-08 LAB
ANION GAP SERPL CALC-SCNC: 9.3 MMOL/L (ref 5–14)
BUN SERPL-MCNC: 36 MG/DL (ref 7–18)
CALCIUM SERPL-MCNC: 7.8 MG/DL (ref 8.5–10.1)
CHLORIDE SERPL-SCNC: 109 MMOL/L (ref 100–108)
CO2 SERPL-SCNC: 23 MMOL/L (ref 21–32)
CREAT CL 24H UR+SERPL-VRATE: 32.62 ML/MIN
CREAT SERPL-MCNC: 1.9 MG/DL (ref 0.8–1.3)
GLUCOSE SERPL-MCNC: 175 MG/DL (ref 74–106)
HCT VFR BLD AUTO: 23.2 % (ref 38.4–49.7)
HGB BLD-MCNC: 8.2 G/DL (ref 12.9–16.9)
MCH RBC QN AUTO: 32 PG (ref 31.6–35.5)
MCHC RBC AUTO-ENTMCNC: 35.3 G/DL (ref 31.6–35.5)
MCHC RBC AUTO-ENTMCNC: 90.6 FL (ref 81.4–99)
PLATELET # BLD AUTO: 73 K/UL (ref 130–375)
POTASSIUM SERPL-SCNC: 4.3 MMOL/L (ref 3.6–5.2)
RBC # BLD AUTO: 2.56 M/UL (ref 4.14–5.76)
SODIUM SERPL-SCNC: 137 MMOL/L (ref 140–148)
WBC # BLD AUTO: 4.7 K/UL (ref 3.2–11)

## 2023-08-08 RX ADMIN — INSULIN LISPRO SCH UNITS: 100 INJECTION, SOLUTION INTRAVENOUS; SUBCUTANEOUS at 12:14

## 2023-08-08 RX ADMIN — INSULIN LISPRO SCH UNITS: 100 INJECTION, SOLUTION INTRAVENOUS; SUBCUTANEOUS at 08:21

## 2023-08-08 RX ADMIN — Medication SCH UNIT: at 08:20

## 2023-08-08 RX ADMIN — PROPRANOLOL HYDROCHLORIDE SCH MG: 60 CAPSULE, EXTENDED RELEASE ORAL at 08:19

## 2023-08-08 RX ADMIN — INSULIN LISPRO SCH UNITS: 100 INJECTION, SOLUTION INTRAVENOUS; SUBCUTANEOUS at 21:49

## 2023-08-08 RX ADMIN — INSULIN LISPRO SCH UNITS: 100 INJECTION, SOLUTION INTRAVENOUS; SUBCUTANEOUS at 17:08

## 2023-08-08 RX ADMIN — BACITRACIN ZINC SCH DOSE: 500 OINTMENT TOPICAL at 08:19

## 2023-08-09 LAB
ANION GAP SERPL CALC-SCNC: 11.5 MMOL/L (ref 5–14)
BUN SERPL-MCNC: 31 MG/DL (ref 7–18)
CALCIUM SERPL-MCNC: 7.9 MG/DL (ref 8.5–10.1)
CHLORIDE SERPL-SCNC: 109 MMOL/L (ref 100–108)
CO2 SERPL-SCNC: 23 MMOL/L (ref 21–32)
CREAT CL 24H UR+SERPL-VRATE: 38.74 ML/MIN
CREAT SERPL-MCNC: 1.6 MG/DL (ref 0.8–1.3)
GLUCOSE SERPL-MCNC: 137 MG/DL (ref 74–106)
HCT VFR BLD AUTO: 25.1 % (ref 38.4–49.7)
HGB BLD-MCNC: 8.7 G/DL (ref 12.9–16.9)
MCH RBC QN AUTO: 31.6 PG (ref 31.6–35.5)
MCHC RBC AUTO-ENTMCNC: 34.7 G/DL (ref 31.6–35.5)
MCHC RBC AUTO-ENTMCNC: 91.3 FL (ref 81.4–99)
PLATELET # BLD AUTO: 76 K/UL (ref 130–375)
POTASSIUM SERPL-SCNC: 4.5 MMOL/L (ref 3.6–5.2)
RBC # BLD AUTO: 2.75 M/UL (ref 4.14–5.76)
SODIUM SERPL-SCNC: 139 MMOL/L (ref 140–148)
WBC # BLD AUTO: 3.8 K/UL (ref 3.2–11)

## 2023-08-09 RX ADMIN — PROPRANOLOL HYDROCHLORIDE SCH MG: 60 CAPSULE, EXTENDED RELEASE ORAL at 08:15

## 2023-08-09 RX ADMIN — INSULIN LISPRO SCH: 100 INJECTION, SOLUTION INTRAVENOUS; SUBCUTANEOUS at 08:14

## 2023-08-09 RX ADMIN — BACITRACIN ZINC SCH DOSE: 500 OINTMENT TOPICAL at 08:15

## 2023-08-09 RX ADMIN — Medication SCH UNIT: at 10:46
